# Patient Record
Sex: FEMALE | Race: WHITE | NOT HISPANIC OR LATINO | Employment: OTHER | URBAN - METROPOLITAN AREA
[De-identification: names, ages, dates, MRNs, and addresses within clinical notes are randomized per-mention and may not be internally consistent; named-entity substitution may affect disease eponyms.]

---

## 2023-01-21 PROBLEM — M77.41 METATARSALGIA OF RIGHT FOOT: Status: ACTIVE | Noted: 2023-01-21

## 2023-01-21 PROBLEM — I48.91 ATRIAL FIBRILLATION (MULTI): Status: ACTIVE | Noted: 2023-01-21

## 2023-01-21 PROBLEM — K21.9 GERD (GASTROESOPHAGEAL REFLUX DISEASE): Status: ACTIVE | Noted: 2023-01-21

## 2023-01-21 PROBLEM — I73.9 CLAUDICATION OF BOTH LOWER EXTREMITIES (CMS-HCC): Status: ACTIVE | Noted: 2023-01-21

## 2023-01-21 PROBLEM — E78.5 ELEVATED LIPIDS: Status: ACTIVE | Noted: 2023-01-21

## 2023-01-21 PROBLEM — R26.89 ANTALGIC GAIT: Status: ACTIVE | Noted: 2023-01-21

## 2023-01-21 PROBLEM — I10 HBP (HIGH BLOOD PRESSURE): Status: ACTIVE | Noted: 2023-01-21

## 2023-01-21 PROBLEM — H90.3 BILATERAL HIGH FREQUENCY SENSORINEURAL HEARING LOSS: Status: ACTIVE | Noted: 2023-01-21

## 2023-01-21 PROBLEM — F32.A DEPRESSION: Status: ACTIVE | Noted: 2023-01-21

## 2023-01-21 PROBLEM — C43.62 MALIGNANT MELANOMA OF LEFT FOREARM (MULTI): Status: ACTIVE | Noted: 2023-01-21

## 2023-01-21 PROBLEM — M17.11 ARTHRITIS OF RIGHT KNEE: Status: ACTIVE | Noted: 2023-01-21

## 2023-01-21 PROBLEM — M79.671 FOOT PAIN, RIGHT: Status: ACTIVE | Noted: 2023-01-21

## 2023-01-21 PROBLEM — M19.90 ARTHRITIS: Status: ACTIVE | Noted: 2023-01-21

## 2023-01-21 PROBLEM — M17.12 ARTHRITIS OF LEFT KNEE: Status: ACTIVE | Noted: 2023-01-21

## 2023-01-21 PROBLEM — M20.41 ACQUIRED HAMMER TOE OF RIGHT FOOT: Status: ACTIVE | Noted: 2023-01-21

## 2023-01-21 PROBLEM — E55.9 VITAMIN D DEFICIENCY, UNSPECIFIED: Status: ACTIVE | Noted: 2023-01-21

## 2023-01-21 PROBLEM — M51.26 HERNIATED LUMBAR INTERVERTEBRAL DISC: Status: ACTIVE | Noted: 2023-01-21

## 2023-01-21 PROBLEM — F32.1 DEPRESSION, MAJOR, SINGLE EPISODE, MODERATE (MULTI): Status: ACTIVE | Noted: 2023-01-21

## 2023-01-21 RX ORDER — HYDROCODONE BITARTRATE AND ACETAMINOPHEN 5; 325 MG/1; MG/1
1 TABLET ORAL EVERY 6 HOURS PRN
COMMUNITY
End: 2023-06-06 | Stop reason: ALTCHOICE

## 2023-01-21 RX ORDER — TORSEMIDE 20 MG/1
1 TABLET ORAL
COMMUNITY
Start: 2013-06-10

## 2023-01-21 RX ORDER — RAMIPRIL 5 MG/1
1 CAPSULE ORAL DAILY
COMMUNITY
Start: 2012-05-17 | End: 2023-11-29 | Stop reason: SDUPTHER

## 2023-01-21 RX ORDER — DIGOXIN 250 MCG
1 TABLET ORAL DAILY
COMMUNITY
Start: 2016-02-26 | End: 2023-08-29 | Stop reason: ALTCHOICE

## 2023-01-21 RX ORDER — FLUOXETINE HYDROCHLORIDE 40 MG/1
1 CAPSULE ORAL DAILY
COMMUNITY
Start: 2012-05-17 | End: 2023-03-07 | Stop reason: SDUPTHER

## 2023-01-21 RX ORDER — CYCLOBENZAPRINE HCL 10 MG
10 TABLET ORAL NIGHTLY
COMMUNITY
End: 2024-01-04 | Stop reason: SDUPTHER

## 2023-01-21 RX ORDER — WARFARIN SODIUM 5 MG/1
1 TABLET ORAL
COMMUNITY
Start: 2012-01-19 | End: 2023-03-07 | Stop reason: SDUPTHER

## 2023-01-21 RX ORDER — MOMETASONE FUROATE 1 MG/G
1 CREAM TOPICAL 2 TIMES DAILY
COMMUNITY
Start: 2017-05-30

## 2023-01-21 RX ORDER — POTASSIUM CHLORIDE 20 MEQ/1
1 TABLET, EXTENDED RELEASE ORAL DAILY
COMMUNITY
Start: 2013-06-10

## 2023-01-21 RX ORDER — WARFARIN 7.5 MG/1
7.5 TABLET ORAL 3 TIMES WEEKLY
COMMUNITY
Start: 2021-11-18 | End: 2023-03-07 | Stop reason: SDUPTHER

## 2023-01-21 RX ORDER — ATORVASTATIN CALCIUM 40 MG/1
1 TABLET, FILM COATED ORAL DAILY
COMMUNITY
Start: 2019-06-28 | End: 2023-03-07 | Stop reason: SDUPTHER

## 2023-01-21 RX ORDER — VERAPAMIL HYDROCHLORIDE 180 MG/1
1 TABLET, FILM COATED, EXTENDED RELEASE ORAL DAILY
COMMUNITY
Start: 2012-08-23 | End: 2023-08-29 | Stop reason: SDUPTHER

## 2023-01-21 RX ORDER — ESOMEPRAZOLE MAGNESIUM 40 MG/1
40 CAPSULE, DELAYED RELEASE ORAL DAILY
COMMUNITY
End: 2023-08-29 | Stop reason: SDUPTHER

## 2023-01-21 RX ORDER — ACYCLOVIR 50 MG/G
1 OINTMENT TOPICAL 3 TIMES DAILY
COMMUNITY
End: 2023-06-06 | Stop reason: SDUPTHER

## 2023-02-26 PROBLEM — M19.012 GLENOHUMERAL ARTHRITIS, LEFT: Status: ACTIVE | Noted: 2023-02-26

## 2023-02-26 PROBLEM — M25.512 PAIN IN JOINT OF LEFT SHOULDER: Status: ACTIVE | Noted: 2023-02-26

## 2023-03-07 ENCOUNTER — OFFICE VISIT (OUTPATIENT)
Dept: PRIMARY CARE | Facility: CLINIC | Age: 78
End: 2023-03-07
Payer: MEDICARE

## 2023-03-07 VITALS
TEMPERATURE: 97.2 F | WEIGHT: 198 LBS | BODY MASS INDEX: 31.96 KG/M2 | SYSTOLIC BLOOD PRESSURE: 108 MMHG | HEART RATE: 86 BPM | DIASTOLIC BLOOD PRESSURE: 70 MMHG | OXYGEN SATURATION: 96 %

## 2023-03-07 DIAGNOSIS — I48.0 PAROXYSMAL ATRIAL FIBRILLATION (MULTI): Primary | ICD-10-CM

## 2023-03-07 DIAGNOSIS — Z12.39 SCREENING BREAST EXAMINATION: ICD-10-CM

## 2023-03-07 DIAGNOSIS — E78.5 ELEVATED LIPIDS: ICD-10-CM

## 2023-03-07 DIAGNOSIS — F32.1 DEPRESSION, MAJOR, SINGLE EPISODE, MODERATE (MULTI): ICD-10-CM

## 2023-03-07 DIAGNOSIS — Z12.31 ENCOUNTER FOR SCREENING MAMMOGRAM FOR MALIGNANT NEOPLASM OF BREAST: ICD-10-CM

## 2023-03-07 PROCEDURE — 1159F MED LIST DOCD IN RCRD: CPT | Performed by: FAMILY MEDICINE

## 2023-03-07 PROCEDURE — 99214 OFFICE O/P EST MOD 30 MIN: CPT | Performed by: FAMILY MEDICINE

## 2023-03-07 PROCEDURE — 3074F SYST BP LT 130 MM HG: CPT | Performed by: FAMILY MEDICINE

## 2023-03-07 PROCEDURE — 3078F DIAST BP <80 MM HG: CPT | Performed by: FAMILY MEDICINE

## 2023-03-07 PROCEDURE — 1036F TOBACCO NON-USER: CPT | Performed by: FAMILY MEDICINE

## 2023-03-07 RX ORDER — ATORVASTATIN CALCIUM 40 MG/1
40 TABLET, FILM COATED ORAL DAILY
Qty: 90 TABLET | Refills: 1 | Status: SHIPPED | OUTPATIENT
Start: 2023-03-07 | End: 2023-07-06 | Stop reason: SDUPTHER

## 2023-03-07 RX ORDER — WARFARIN 7.5 MG/1
7.5 TABLET ORAL 3 TIMES WEEKLY
Qty: 90 TABLET | Refills: 1 | Status: SHIPPED | OUTPATIENT
Start: 2023-03-08

## 2023-03-07 RX ORDER — FLUOXETINE HYDROCHLORIDE 40 MG/1
40 CAPSULE ORAL 2 TIMES DAILY
Qty: 180 CAPSULE | Refills: 0 | Status: SHIPPED | OUTPATIENT
Start: 2023-03-07 | End: 2023-06-06 | Stop reason: SDUPTHER

## 2023-03-07 RX ORDER — WARFARIN SODIUM 5 MG/1
5 TABLET ORAL
Qty: 90 TABLET | Refills: 1 | Status: SHIPPED | OUTPATIENT
Start: 2023-03-07 | End: 2023-08-29 | Stop reason: SDUPTHER

## 2023-03-07 ASSESSMENT — PATIENT HEALTH QUESTIONNAIRE - PHQ9
2. FEELING DOWN, DEPRESSED OR HOPELESS: NOT AT ALL
1. LITTLE INTEREST OR PLEASURE IN DOING THINGS: NOT AT ALL
SUM OF ALL RESPONSES TO PHQ9 QUESTIONS 1 AND 2: 0

## 2023-03-07 ASSESSMENT — ENCOUNTER SYMPTOMS
OCCASIONAL FEELINGS OF UNSTEADINESS: 0
LOSS OF SENSATION IN FEET: 0
DEPRESSION: 0

## 2023-03-07 NOTE — PROGRESS NOTES
Subjective   Patient ID: Regina Tam is a 77 y.o. female who presents for Follow-up.    HPI Pt is doing ok  Goes to Coumadin clinic  Pt denies cp, sob,edema, dizziness  Pt mood is  not good, they lost their home in [place]  Needs lipids checked      Review of Systems   All other systems reviewed and are negative.      Objective   /70   Pulse 86   Temp 36.2 °C (97.2 °F)   Wt 89.8 kg (198 lb)   SpO2 96%   BMI 31.96 kg/m²     Physical Exam  Constitutional:       Appearance: Normal appearance.   HENT:      Head: Normocephalic and atraumatic.      Right Ear: Tympanic membrane, ear canal and external ear normal.      Left Ear: Tympanic membrane, ear canal and external ear normal.      Nose: Nose normal.      Mouth/Throat:      Mouth: Mucous membranes are moist.      Pharynx: Oropharynx is clear.   Eyes:      Extraocular Movements: Extraocular movements intact.      Conjunctiva/sclera: Conjunctivae normal.      Pupils: Pupils are equal, round, and reactive to light.   Cardiovascular:      Rate and Rhythm: Normal rate and regular rhythm.      Pulses: Normal pulses.      Heart sounds: Normal heart sounds.   Pulmonary:      Effort: Pulmonary effort is normal.      Breath sounds: Normal breath sounds.   Abdominal:      General: Abdomen is flat. Bowel sounds are normal.      Palpations: Abdomen is soft.   Genitourinary:     Rectum: Normal.   Musculoskeletal:         General: Normal range of motion.      Cervical back: Normal range of motion and neck supple.   Skin:     General: Skin is warm and dry.      Capillary Refill: Capillary refill takes less than 2 seconds.   Neurological:      General: No focal deficit present.      Mental Status: She is alert and oriented to person, place, and time.   Psychiatric:         Mood and Affect: Mood normal.         Behavior: Behavior normal.         Thought Content: Thought content normal.         Assessment/Plan   Diagnoses and all orders for this visit:  Paroxysmal atrial  fibrillation (CMS/HCC)  -     warfarin (Coumadin) 7.5 mg tablet; Take 1 tablet (7.5 mg) by mouth 3 times a week.  -     warfarin (Coumadin) 5 mg tablet; Take 1 tablet (5 mg) by mouth 4 times a week.  Elevated lipids  -     atorvastatin (Lipitor) 40 mg tablet; Take 1 tablet (40 mg) by mouth once daily.  -     Comprehensive Metabolic Panel; Future  -     Lipid Panel; Future  Screening breast examination  -     BI mammo bilateral screening tomosynthesis; Future  Encounter for screening mammogram for malignant neoplasm of breast  -     BI mammo bilateral screening tomosynthesis; Future  Depression, major, single episode, moderate (CMS/HCC)  -     FLUoxetine (PROzac) 40 mg capsule; Take 1 capsule (40 mg) by mouth in the morning and 1 capsule (40 mg) before bedtime.

## 2023-04-24 ENCOUNTER — TELEPHONE (OUTPATIENT)
Dept: PRIMARY CARE | Facility: CLINIC | Age: 78
End: 2023-04-24
Payer: MEDICARE

## 2023-04-24 ENCOUNTER — DOCUMENTATION (OUTPATIENT)
Dept: PRIMARY CARE | Facility: CLINIC | Age: 78
End: 2023-04-24
Payer: MEDICARE

## 2023-04-24 DIAGNOSIS — M51.26 HERNIATED LUMBAR INTERVERTEBRAL DISC: Primary | ICD-10-CM

## 2023-05-18 DIAGNOSIS — F32.1 DEPRESSION, MAJOR, SINGLE EPISODE, MODERATE (MULTI): ICD-10-CM

## 2023-05-22 RX ORDER — FLUOXETINE HYDROCHLORIDE 40 MG/1
CAPSULE ORAL
Qty: 180 CAPSULE | Refills: 3 | OUTPATIENT
Start: 2023-05-22

## 2023-05-23 ENCOUNTER — LAB (OUTPATIENT)
Dept: LAB | Facility: LAB | Age: 78
End: 2023-05-23
Payer: MEDICARE

## 2023-05-23 DIAGNOSIS — E78.5 ELEVATED LIPIDS: ICD-10-CM

## 2023-05-23 LAB
ALANINE AMINOTRANSFERASE (SGPT) (U/L) IN SER/PLAS: 38 U/L (ref 7–45)
ALBUMIN (G/DL) IN SER/PLAS: 4.1 G/DL (ref 3.4–5)
ALKALINE PHOSPHATASE (U/L) IN SER/PLAS: 258 U/L (ref 33–136)
ANION GAP IN SER/PLAS: 11 MMOL/L (ref 10–20)
ASPARTATE AMINOTRANSFERASE (SGOT) (U/L) IN SER/PLAS: 38 U/L (ref 9–39)
BILIRUBIN TOTAL (MG/DL) IN SER/PLAS: 0.5 MG/DL (ref 0–1.2)
CALCIUM (MG/DL) IN SER/PLAS: 9.6 MG/DL (ref 8.6–10.6)
CARBON DIOXIDE, TOTAL (MMOL/L) IN SER/PLAS: 32 MMOL/L (ref 21–32)
CHLORIDE (MMOL/L) IN SER/PLAS: 102 MMOL/L (ref 98–107)
CHOLESTEROL (MG/DL) IN SER/PLAS: 136 MG/DL (ref 0–199)
CHOLESTEROL IN HDL (MG/DL) IN SER/PLAS: 42.5 MG/DL
CHOLESTEROL/HDL RATIO: 3.2
CREATININE (MG/DL) IN SER/PLAS: 0.76 MG/DL (ref 0.5–1.05)
GFR FEMALE: 81 ML/MIN/1.73M2
GLUCOSE (MG/DL) IN SER/PLAS: 85 MG/DL (ref 74–99)
LDL: 61 MG/DL (ref 0–99)
POTASSIUM (MMOL/L) IN SER/PLAS: 4.9 MMOL/L (ref 3.5–5.3)
PROTEIN TOTAL: 7.1 G/DL (ref 6.4–8.2)
SODIUM (MMOL/L) IN SER/PLAS: 140 MMOL/L (ref 136–145)
TRIGLYCERIDE (MG/DL) IN SER/PLAS: 161 MG/DL (ref 0–149)
UREA NITROGEN (MG/DL) IN SER/PLAS: 19 MG/DL (ref 6–23)
VLDL: 32 MG/DL (ref 0–40)

## 2023-05-23 PROCEDURE — 80053 COMPREHEN METABOLIC PANEL: CPT

## 2023-05-23 PROCEDURE — 36415 COLL VENOUS BLD VENIPUNCTURE: CPT

## 2023-05-23 PROCEDURE — 80061 LIPID PANEL: CPT

## 2023-06-06 ENCOUNTER — OFFICE VISIT (OUTPATIENT)
Dept: PRIMARY CARE | Facility: CLINIC | Age: 78
End: 2023-06-06
Payer: MEDICARE

## 2023-06-06 VITALS
SYSTOLIC BLOOD PRESSURE: 120 MMHG | DIASTOLIC BLOOD PRESSURE: 77 MMHG | WEIGHT: 197 LBS | BODY MASS INDEX: 31.8 KG/M2 | OXYGEN SATURATION: 98 % | HEART RATE: 79 BPM

## 2023-06-06 DIAGNOSIS — R60.0 EDEMA, LOWER EXTREMITY: ICD-10-CM

## 2023-06-06 DIAGNOSIS — L20.9 ATOPIC DERMATITIS, UNSPECIFIED TYPE: Primary | ICD-10-CM

## 2023-06-06 DIAGNOSIS — I73.9 CLAUDICATION OF BOTH LOWER EXTREMITIES (CMS-HCC): ICD-10-CM

## 2023-06-06 DIAGNOSIS — M17.12 ARTHRITIS OF LEFT KNEE: ICD-10-CM

## 2023-06-06 DIAGNOSIS — F32.1 DEPRESSION, MAJOR, SINGLE EPISODE, MODERATE (MULTI): ICD-10-CM

## 2023-06-06 PROCEDURE — 1036F TOBACCO NON-USER: CPT | Performed by: FAMILY MEDICINE

## 2023-06-06 PROCEDURE — 1160F RVW MEDS BY RX/DR IN RCRD: CPT | Performed by: FAMILY MEDICINE

## 2023-06-06 PROCEDURE — 99214 OFFICE O/P EST MOD 30 MIN: CPT | Performed by: FAMILY MEDICINE

## 2023-06-06 PROCEDURE — 3078F DIAST BP <80 MM HG: CPT | Performed by: FAMILY MEDICINE

## 2023-06-06 PROCEDURE — 3074F SYST BP LT 130 MM HG: CPT | Performed by: FAMILY MEDICINE

## 2023-06-06 PROCEDURE — 1159F MED LIST DOCD IN RCRD: CPT | Performed by: FAMILY MEDICINE

## 2023-06-06 RX ORDER — DESONIDE 0.5 MG/G
CREAM TOPICAL
COMMUNITY

## 2023-06-06 RX ORDER — ACYCLOVIR 50 MG/G
1 OINTMENT TOPICAL 3 TIMES DAILY
Qty: 60 G | Refills: 1 | Status: SHIPPED | OUTPATIENT
Start: 2023-06-06

## 2023-06-06 RX ORDER — FLUOXETINE HYDROCHLORIDE 40 MG/1
40 CAPSULE ORAL 2 TIMES DAILY
Qty: 180 CAPSULE | Refills: 0 | Status: SHIPPED | OUTPATIENT
Start: 2023-06-06 | End: 2023-08-29 | Stop reason: SDUPTHER

## 2023-06-06 RX ORDER — VIT C/E/ZN/COPPR/LUTEIN/ZEAXAN 250MG-90MG
1000 CAPSULE ORAL
COMMUNITY

## 2023-06-06 RX ORDER — FLUOCINONIDE TOPICAL SOLUTION USP, 0.05% 0.5 MG/ML
SOLUTION TOPICAL 2 TIMES DAILY PRN
Qty: 60 ML | Refills: 3 | Status: SHIPPED | OUTPATIENT
Start: 2023-06-06 | End: 2024-06-05

## 2023-06-06 RX ORDER — TRAMADOL HYDROCHLORIDE 50 MG/1
TABLET ORAL
COMMUNITY
End: 2023-06-06 | Stop reason: ALTCHOICE

## 2023-06-06 ASSESSMENT — PATIENT HEALTH QUESTIONNAIRE - PHQ9
2. FEELING DOWN, DEPRESSED OR HOPELESS: MORE THAN HALF THE DAYS
10. IF YOU CHECKED OFF ANY PROBLEMS, HOW DIFFICULT HAVE THESE PROBLEMS MADE IT FOR YOU TO DO YOUR WORK, TAKE CARE OF THINGS AT HOME, OR GET ALONG WITH OTHER PEOPLE: SOMEWHAT DIFFICULT
1. LITTLE INTEREST OR PLEASURE IN DOING THINGS: NOT AT ALL
SUM OF ALL RESPONSES TO PHQ9 QUESTIONS 1 AND 2: 2

## 2023-06-06 ASSESSMENT — ENCOUNTER SYMPTOMS
LOSS OF SENSATION IN FEET: 0
OCCASIONAL FEELINGS OF UNSTEADINESS: 0
DEPRESSION: 0

## 2023-06-06 NOTE — PROGRESS NOTES
Subjective   Patient ID: Regina Tam is a 77 y.o. female who presents for Follow-up (FOLLOW UP).    HPI   Pt is doing ok  Pt needsrefill on creams for eczema and hsv  Mood stable  PT/inr good now in coumadin clinic  Pt denies cp, sob  Does have edema, pain and discoloration LLE    Review of Systems   Cardiovascular:  Positive for leg swelling.   All other systems reviewed and are negative.      Objective   /77   Pulse 79   Wt 89.4 kg (197 lb)   SpO2 98%   BMI 31.80 kg/m²     Physical Exam  Constitutional:       Appearance: Normal appearance.   HENT:      Head: Normocephalic and atraumatic.      Right Ear: Tympanic membrane, ear canal and external ear normal.      Left Ear: Tympanic membrane, ear canal and external ear normal.      Nose: Nose normal.      Mouth/Throat:      Mouth: Mucous membranes are moist.      Pharynx: Oropharynx is clear.   Eyes:      Extraocular Movements: Extraocular movements intact.      Conjunctiva/sclera: Conjunctivae normal.      Pupils: Pupils are equal, round, and reactive to light.   Cardiovascular:      Rate and Rhythm: Normal rate and regular rhythm.      Pulses: Normal pulses.      Heart sounds: Normal heart sounds.   Pulmonary:      Effort: Pulmonary effort is normal.      Breath sounds: Normal breath sounds.   Abdominal:      General: Abdomen is flat. Bowel sounds are normal.      Palpations: Abdomen is soft.   Musculoskeletal:         General: Normal range of motion.      Cervical back: Normal range of motion and neck supple.      Left lower leg: Edema present.   Skin:     General: Skin is warm and dry.      Capillary Refill: Capillary refill takes less than 2 seconds.   Neurological:      General: No focal deficit present.      Mental Status: She is alert and oriented to person, place, and time.   Psychiatric:         Mood and Affect: Mood normal.         Behavior: Behavior normal.         Thought Content: Thought content normal.         Assessment/Plan    Diagnoses and all orders for this visit:  Atopic dermatitis, unspecified type  -     acyclovir (Zovirax) 5 % ointment; Apply 1 Application topically 3 times a day.  -     fluocinonide (Lidex) 0.05 % external solution; Apply topically 2 times a day as needed for irritation or rash.  Depression, major, single episode, moderate (CMS/HCC)  -     FLUoxetine (PROzac) 40 mg capsule; Take 1 capsule (40 mg) by mouth 2 times a day.  Claudication of both lower extremities (CMS/HCC)  -     Disability Placard  Arthritis of left knee  -     Disability Placard  Edema, lower extremity  -     Lower extremity venous duplex left; Future

## 2023-07-06 DIAGNOSIS — E78.5 ELEVATED LIPIDS: ICD-10-CM

## 2023-07-06 RX ORDER — ATORVASTATIN CALCIUM 40 MG/1
40 TABLET, FILM COATED ORAL DAILY
Qty: 90 TABLET | Refills: 0 | Status: SHIPPED | OUTPATIENT
Start: 2023-07-06 | End: 2023-08-29 | Stop reason: SDUPTHER

## 2023-07-06 NOTE — TELEPHONE ENCOUNTER
PT CALLED REQUESTING A REFILL OF ATORVASTATIN TO [facility]. LAST APPT WAS 6.6.23 AND NEXT IS 8.29.23

## 2023-08-29 ENCOUNTER — OFFICE VISIT (OUTPATIENT)
Dept: PRIMARY CARE | Facility: CLINIC | Age: 78
End: 2023-08-29
Payer: MEDICARE

## 2023-08-29 VITALS
WEIGHT: 198 LBS | BODY MASS INDEX: 31.96 KG/M2 | DIASTOLIC BLOOD PRESSURE: 78 MMHG | HEART RATE: 89 BPM | SYSTOLIC BLOOD PRESSURE: 122 MMHG | OXYGEN SATURATION: 98 %

## 2023-08-29 DIAGNOSIS — I48.91 ATRIAL FIBRILLATION, UNSPECIFIED TYPE (MULTI): Primary | ICD-10-CM

## 2023-08-29 DIAGNOSIS — E78.5 ELEVATED LIPIDS: ICD-10-CM

## 2023-08-29 DIAGNOSIS — E78.00 PURE HYPERCHOLESTEROLEMIA: ICD-10-CM

## 2023-08-29 DIAGNOSIS — C43.62 MALIGNANT MELANOMA OF LEFT FOREARM (MULTI): ICD-10-CM

## 2023-08-29 DIAGNOSIS — I73.9 CLAUDICATION OF BOTH LOWER EXTREMITIES (CMS-HCC): ICD-10-CM

## 2023-08-29 DIAGNOSIS — I48.0 PAROXYSMAL ATRIAL FIBRILLATION (MULTI): ICD-10-CM

## 2023-08-29 DIAGNOSIS — F32.1 DEPRESSION, MAJOR, SINGLE EPISODE, MODERATE (MULTI): ICD-10-CM

## 2023-08-29 DIAGNOSIS — K21.9 GASTROESOPHAGEAL REFLUX DISEASE WITHOUT ESOPHAGITIS: ICD-10-CM

## 2023-08-29 DIAGNOSIS — I10 PRIMARY HYPERTENSION: ICD-10-CM

## 2023-08-29 PROBLEM — T84.9XXA COMPLICATION OF INTERNAL RIGHT KNEE PROSTHESIS (CMS-HCC): Status: ACTIVE | Noted: 2023-08-04

## 2023-08-29 PROBLEM — H35.3231 EXUDATIVE AGE-RELATED MACULAR DEGENERATION, BILATERAL, WITH ACTIVE CHOROIDAL NEOVASCULARIZATION (MULTI): Status: ACTIVE | Noted: 2017-07-14

## 2023-08-29 PROBLEM — Z96.651 HISTORY OF TOTAL KNEE REPLACEMENT, RIGHT: Status: RESOLVED | Noted: 2023-07-20 | Resolved: 2023-08-29

## 2023-08-29 PROBLEM — M75.80 ROTATOR CUFF TENDONITIS: Status: RESOLVED | Noted: 2023-08-29 | Resolved: 2023-08-29

## 2023-08-29 PROBLEM — R91.8 LUNG NODULES: Status: ACTIVE | Noted: 2023-08-29

## 2023-08-29 PROBLEM — Z96.651 COMPLICATION OF INTERNAL RIGHT KNEE PROSTHESIS (CMS-HCC): Status: ACTIVE | Noted: 2023-08-04

## 2023-08-29 PROBLEM — I11.9 HYPERTENSIVE CARDIOVASCULAR DISEASE: Status: ACTIVE | Noted: 2023-08-29

## 2023-08-29 PROBLEM — S80.12XA LEG HEMATOMA, LEFT, INITIAL ENCOUNTER: Status: RESOLVED | Noted: 2023-08-29 | Resolved: 2023-08-29

## 2023-08-29 PROBLEM — E04.1 THYROID NODULE: Status: ACTIVE | Noted: 2023-08-29

## 2023-08-29 PROCEDURE — 1036F TOBACCO NON-USER: CPT | Performed by: FAMILY MEDICINE

## 2023-08-29 PROCEDURE — 1160F RVW MEDS BY RX/DR IN RCRD: CPT | Performed by: FAMILY MEDICINE

## 2023-08-29 PROCEDURE — 3074F SYST BP LT 130 MM HG: CPT | Performed by: FAMILY MEDICINE

## 2023-08-29 PROCEDURE — 1159F MED LIST DOCD IN RCRD: CPT | Performed by: FAMILY MEDICINE

## 2023-08-29 PROCEDURE — 99214 OFFICE O/P EST MOD 30 MIN: CPT | Performed by: FAMILY MEDICINE

## 2023-08-29 PROCEDURE — 3078F DIAST BP <80 MM HG: CPT | Performed by: FAMILY MEDICINE

## 2023-08-29 RX ORDER — CHLORHEXIDINE GLUCONATE ORAL RINSE 1.2 MG/ML
SOLUTION DENTAL
COMMUNITY
Start: 2023-08-14

## 2023-08-29 RX ORDER — VERAPAMIL HYDROCHLORIDE 180 MG/1
180 TABLET, FILM COATED, EXTENDED RELEASE ORAL NIGHTLY
Qty: 90 TABLET | Refills: 1 | Status: SHIPPED | OUTPATIENT
Start: 2023-08-29 | End: 2024-02-23

## 2023-08-29 RX ORDER — WARFARIN SODIUM 5 MG/1
5 TABLET ORAL
Qty: 90 TABLET | Refills: 1 | Status: SHIPPED | OUTPATIENT
Start: 2023-08-29 | End: 2024-05-22

## 2023-08-29 RX ORDER — ESOMEPRAZOLE MAGNESIUM 40 MG/1
40 CAPSULE, DELAYED RELEASE ORAL
Qty: 90 CAPSULE | Refills: 3 | Status: SHIPPED | OUTPATIENT
Start: 2023-08-29 | End: 2023-09-05 | Stop reason: SDUPTHER

## 2023-08-29 RX ORDER — DIGOXIN 125 MCG
125 TABLET ORAL DAILY
Qty: 90 TABLET | Refills: 3 | Status: SHIPPED | OUTPATIENT
Start: 2023-08-29 | End: 2024-08-28

## 2023-08-29 RX ORDER — ATORVASTATIN CALCIUM 40 MG/1
40 TABLET, FILM COATED ORAL DAILY
Qty: 90 TABLET | Refills: 1 | Status: SHIPPED | OUTPATIENT
Start: 2023-08-29 | End: 2024-03-05 | Stop reason: SDUPTHER

## 2023-08-29 RX ORDER — FLUOXETINE HYDROCHLORIDE 40 MG/1
40 CAPSULE ORAL 2 TIMES DAILY
Qty: 180 CAPSULE | Refills: 1 | Status: SHIPPED | OUTPATIENT
Start: 2023-08-29 | End: 2023-11-29 | Stop reason: SDUPTHER

## 2023-08-29 ASSESSMENT — ENCOUNTER SYMPTOMS
ARTHRALGIAS: 1
DEPRESSION: 0
OCCASIONAL FEELINGS OF UNSTEADINESS: 0
LOSS OF SENSATION IN FEET: 0
MYALGIAS: 1
BACK PAIN: 1

## 2023-08-29 NOTE — PROGRESS NOTES
Subjective   Patient ID: Regina Tam is a 77 y.o. female who presents for Follow-up (FOLLOW UP).    HPI   Pt is doing well  Will be having nee surgery and colonoscopy  Pt denies cp,sob  Goes to coumadin clinic  Mood stable  Review of Systems   Musculoskeletal:  Positive for arthralgias, back pain and myalgias.   All other systems reviewed and are negative.      Objective   /78   Pulse 89   Wt 89.8 kg (198 lb)   SpO2 98%   BMI 31.96 kg/m²     Physical Exam  Constitutional:       Appearance: Normal appearance.   HENT:      Head: Normocephalic and atraumatic.      Right Ear: Tympanic membrane, ear canal and external ear normal.      Left Ear: Tympanic membrane, ear canal and external ear normal.      Nose: Nose normal.      Mouth/Throat:      Mouth: Mucous membranes are moist.      Pharynx: Oropharynx is clear.   Eyes:      Extraocular Movements: Extraocular movements intact.      Conjunctiva/sclera: Conjunctivae normal.      Pupils: Pupils are equal, round, and reactive to light.   Cardiovascular:      Rate and Rhythm: Normal rate and regular rhythm.      Pulses: Normal pulses.      Heart sounds: Normal heart sounds.   Pulmonary:      Effort: Pulmonary effort is normal.      Breath sounds: Normal breath sounds.   Abdominal:      General: Abdomen is flat. Bowel sounds are normal.      Palpations: Abdomen is soft.   Musculoskeletal:         General: Normal range of motion.      Cervical back: Normal range of motion and neck supple.   Skin:     General: Skin is warm and dry.      Capillary Refill: Capillary refill takes less than 2 seconds.   Neurological:      General: No focal deficit present.      Mental Status: She is alert and oriented to person, place, and time.   Psychiatric:         Mood and Affect: Mood normal.         Behavior: Behavior normal.         Thought Content: Thought content normal.         Assessment/Plan   Diagnoses and all orders for this visit:  Atrial fibrillation, unspecified  type (CMS/HCC)  -     digoxin (Lanoxin) 125 MCG tablet; Take 1 tablet (125 mcg) by mouth once daily.  -     verapamil SR (Calan-SR) 180 mg ER tablet; Take 1 tablet (180 mg) by mouth once daily at bedtime. Take as directed.  -     TSH with reflex to Free T4 if abnormal; Future  -     Lipid Panel; Future  -     Comprehensive Metabolic Panel; Future  -     CBC and Auto Differential; Future  Malignant melanoma of left forearm (CMS/HCC)  Claudication of both lower extremities (CMS/HCC)  Primary hypertension  Paroxysmal atrial fibrillation (CMS/HCC)  -     warfarin (Coumadin) 5 mg tablet; Take 1 tablet (5 mg) by mouth 4 times a week.  Depression, major, single episode, moderate (CMS/HCC)  -     FLUoxetine (PROzac) 40 mg capsule; Take 1 capsule (40 mg) by mouth 2 times a day.  Elevated lipids  -     atorvastatin (Lipitor) 40 mg tablet; Take 1 tablet (40 mg) by mouth once daily.  Gastroesophageal reflux disease without esophagitis  -     esomeprazole (NexIUM) 40 mg DR capsule; Take 1 capsule (40 mg) by mouth once daily in the morning. Take before meals. Do not open capsule.  Pure hypercholesterolemia  -     Lipid Panel; Future

## 2023-09-01 ENCOUNTER — TELEPHONE (OUTPATIENT)
Dept: PRIMARY CARE | Facility: CLINIC | Age: 78
End: 2023-09-01
Payer: MEDICARE

## 2023-09-01 NOTE — TELEPHONE ENCOUNTER
PT CALLED STATING HER ESOMEPRAZOLE WAS EXPENSIVE, SHE GOT THE SCRIPT FOR THIS MONTH BUT FOR FUTURE SHE WOULD LIKE IT PRINTED SO SHE CAN USE [facility]. TUESDAY  CAN YOU PRINT THE RX OUT AND I CAN MAIL IT TO THE PATIENT? PLEASE ADVISE.

## 2023-09-05 DIAGNOSIS — K21.9 GASTROESOPHAGEAL REFLUX DISEASE WITHOUT ESOPHAGITIS: ICD-10-CM

## 2023-09-05 RX ORDER — ESOMEPRAZOLE MAGNESIUM 40 MG/1
40 CAPSULE, DELAYED RELEASE ORAL
Qty: 90 CAPSULE | Refills: 3 | Status: SHIPPED | OUTPATIENT
Start: 2023-09-05 | End: 2023-11-29 | Stop reason: SDUPTHER

## 2023-09-11 DIAGNOSIS — I48.91 ATRIAL FIBRILLATION, UNSPECIFIED TYPE (MULTI): Primary | ICD-10-CM

## 2023-09-11 LAB
INR IN PPP BY COAGULATION ASSAY EXTERNAL: 3.6
PROTHROMBIN TIME (PT) IN PPP BY COAGULATION ASSAY EXTERNAL: NORMAL SECONDS

## 2023-09-12 ENCOUNTER — TELEMEDICINE (OUTPATIENT)
Dept: PRIMARY CARE | Facility: CLINIC | Age: 78
End: 2023-09-12
Payer: MEDICARE

## 2023-09-12 DIAGNOSIS — J01.01 ACUTE RECURRENT MAXILLARY SINUSITIS: Primary | ICD-10-CM

## 2023-09-12 PROCEDURE — 99213 OFFICE O/P EST LOW 20 MIN: CPT | Performed by: FAMILY MEDICINE

## 2023-09-12 RX ORDER — CLARITHROMYCIN 500 MG/1
500 TABLET, FILM COATED ORAL 2 TIMES DAILY
Qty: 28 TABLET | Refills: 2 | Status: SHIPPED | OUTPATIENT
Start: 2023-09-12 | End: 2023-10-24

## 2023-09-12 ASSESSMENT — ENCOUNTER SYMPTOMS: SINUS PRESSURE: 1

## 2023-09-12 NOTE — PROGRESS NOTES
Subjective   Patient ID: Regina Tam is a 77 y.o. female who presents for VIRTUAL (SICK SINUS ISSUES).  Chief Complaint_UH:  An interactive audio and video telecommunication system which permits real time communications between the patient (at the originating site) and provider (at the distant site) was utilized to provide this telehealth service.   Virtual or Telephone Consent    An interactive audio and video telecommunication system which permits real time communications between the patient (at the originating site) and provider (at the distant site) was utilized to provide this telehealth service.   Verbal consent was requested and obtained from Regina Tam on this date, 09/12/23 for a telehealth visit.    HPI   Pt has sinus pressure and pain  Onset last week  Took 2 covid tests  Negative  Used otc antihistamines  Pt denies cp, sob edema, fevers, chills  Review of Systems   HENT:  Positive for sinus pressure.    All other systems reviewed and are negative.      Objective   There were no vitals taken for this visit.    Physical Exam  Constitutional:       Appearance: Normal appearance.   HENT:      Head: Normocephalic.      Nose: Nose normal.   Pulmonary:      Effort: Pulmonary effort is normal.   Neurological:      Mental Status: She is alert.   Psychiatric:         Mood and Affect: Mood normal.         Behavior: Behavior normal.         Assessment/Plan   Diagnoses and all orders for this visit:  Acute recurrent maxillary sinusitis  -     clarithromycin (Biaxin) 500 mg tablet; Take 1 tablet (500 mg) by mouth 2 times a day.

## 2023-09-18 DIAGNOSIS — I48.91 ATRIAL FIBRILLATION, UNSPECIFIED TYPE (MULTI): Primary | ICD-10-CM

## 2023-10-05 ENCOUNTER — APPOINTMENT (OUTPATIENT)
Dept: CARDIOLOGY | Facility: CLINIC | Age: 78
End: 2023-10-05
Payer: MEDICARE

## 2023-10-05 ENCOUNTER — EVALUATION (OUTPATIENT)
Dept: PHYSICAL THERAPY | Facility: CLINIC | Age: 78
End: 2023-10-05
Payer: MEDICARE

## 2023-10-05 DIAGNOSIS — M17.11 ARTHRITIS OF RIGHT KNEE: Primary | ICD-10-CM

## 2023-10-05 DIAGNOSIS — R26.89 DECREASED FUNCTIONAL MOBILITY: ICD-10-CM

## 2023-10-05 PROCEDURE — 97161 PT EVAL LOW COMPLEX 20 MIN: CPT | Mod: GP | Performed by: PHYSICAL THERAPIST

## 2023-10-05 PROCEDURE — 97110 THERAPEUTIC EXERCISES: CPT | Mod: GP | Performed by: PHYSICAL THERAPIST

## 2023-10-05 ASSESSMENT — PAIN SCALES - GENERAL: PAINLEVEL_OUTOF10: 7

## 2023-10-05 ASSESSMENT — PAIN DESCRIPTION - DESCRIPTORS: DESCRIPTORS: ACHING

## 2023-10-05 ASSESSMENT — PAIN - FUNCTIONAL ASSESSMENT: PAIN_FUNCTIONAL_ASSESSMENT: 0-10

## 2023-10-05 NOTE — LETTER
October 6, 2023    Nicko Nice MD  [address] [facility] [address]    Patient: Regina Tam   YOB: 1945   Date of Visit: 10/5/2023       Dear No referring provider defined for this encounter.    The attached plan of care is being sent to you because your patient’s medical reimbursement requires that you certify the plan of care. Your signature is required to allow uninterrupted insurance coverage.      You may indicate your approval by signing below and faxing this form back to us at Dept Fax: [phone].    Please call Dept: [phone] with any questions or concerns.    Thank you for this referral,        Adriana Solitario PT  [facility] [address] [facility]  [address] [facility] [id number] [phone]    Payer: Payor: MEDICARE / Plan: MEDICARE PART A AND B / Product Type: *No Product type* /                                                                         Date:     Dear Adriana Solitario PT,     Re: Ms. Regina Tam, MRN:[id number]    I certify that I have reviewed the attached plan of care and it is medically necessary for Ms. Regina Tam (1945) who is under my care.          ______________________________________                    _________________  Provider name and credentials                                           Date and time                                                                                           Plan of Care 10/5/23   Effective from: 10/5/2023  Effective to: 1/2/2024    Plan ID: [id number]                Participants as of Finalize on 10/5/2023      Name Type Comments Contact Info    Adriana Solitario PT Physical Therapist  [phone]    Nicko Nice MD Consulting Physician  [phone]           Last Plan Note       Author: Adriana Solitario PT Status: Signed Last edited: 10/5/2023  1:30 PM         Physical Therapy    Physical Therapy Evaluation    Patient Name: Regina Tam  MRN: [id number]  Today's  "Date: 10/5/2023     (per information provided by  pre-cert team)    Visit #: 1  Insurance Reviewed (per information provided by  pre-cert team)  Authorization required:  N  Certification Period:  10-5-23/1-2-24  Preferred Name:  Regina  Script:   eval and treat 1-2/week for 4-6 weeks/cert for 12 weeks    Assessment  Patient with recent TKR, needs work on/Skilled PT for ROM, flexibility, strength, balance, closed chain, gait and stair activities for improved overall function.   Problems:    Pain:  __x_  Posture/Body mechanics deficits:  _x__  Decreased knowledge HEP:  _x__  Decreased knowledge of Precautions:  ___  Activity Limitations:   __x_  ADL's/IADL's/Self-care skills:  _x__  ROM/Joint Mobility:  __x_  Strength:  __x_  Decreased functional level:   __x_  Flexibility:  __x_  Tenderness/decreased soft tissue mobility:  __x_  Gait/Stairs:  _x__  Fall Risk:  ___  Balance:  __x_  Edema:  ___  Participation restrictions:  ___  Sensory:  ___  Transfers:  ___  Decreased knowledge of brace:   ___  Other:  ___    X Indicates included in problem list     Goals:      STG:  In two weeks.   Increased postural awareness.     Compliant with HEP    LTG: by discharge    Increased postural awareness and posture WFL.    Increased function with ADL's and IADL's.  Improve LEFS to:  20 %  limitation of function.    Normal gait pattern  on level and uneven surfaces community level distances for improved function in the community.     Normal reciprocal pattern on stairs for improved function to all levels of home.      Increase  R SLS to 15\"    Increase R knee AROM to 0/120 for improved function with car transfers.     Increase R L strength to WNL for improved function with chores.    Increase R LE flexibility to WFL.      I and compliant with HEP and proper: R LE care.      Rehab potential to achieve the above goals is good.    Patient is aware of diagnosis and prognosis and agrees with established plan of care and goals.    "     Plan  Skilled PT 1-2 x/week for 12 weeks( Until goals achieved, maximum rehab potential has been achieved, or patient has plateaued)  for:    Aquatics  _____  CP   __x__  Dry needling  ____  Education  __x__  Electrical Stimulation  ___x_  Gait training  __x__  HEP  __x__  HP  ____  Manual  __x__  Mechanical Traction  ____  NMR  __x__  Self-care/home management  __x__  Therapeutic Exercise   __x__  Therapeutic Activities  __x__    ____  Work Conditioning  ____  Re-assessment  __x__  Other  ____    X Indicates included in treatment plan    PT for Nu-step for functional mobility and soft tissue warm up for more efficient stretching, work on ROM, flexibility, strength, balance, closed chain, gait and stair activities for improved overall function.                 Subjective     Adult Risk Screening  There are no spiritual/cultural practices/values/needs that are important to know     Initial Fall Risk Screening:   Patient has not fallen in the last 6 months. Patient does not have a fear of falling. They do not need assistance with sitting, standing or walking. Does not need assistance walking in her home. They do not need assistance in an unfamiliar setting. The patient is not using an assistive device.     Domestic Violence Screen: Does not feel threatened or abused physically, emotionally or sexually. Do you feel UNSAFE?   The patient feels safe in the home.     Depression/Suicide Screening:   During the past 2 weeks, the patient has not felt down, depressed or hopeless.    During the past 2 weeks, the patient has not felt little interest or pleasure in doing things.    They do not have a risk of suicide.       Human Trafficking risk:  No    Key Learner:  self  Preferred Learning:  Printed Materials/Demonstration/Discussion  Learning Barriers:  none      Precautions:  Precautions  Precautions Comment: R TKR 2007, revision 9-19-23, L TKR 2009, R RTC repair and L shoulder labral tear, treated with injections,   "A-fib, depression, high cholesterol, see meds in chart.     Pain:  Pain Assessment: 0-10  Pain Score: 7  Pain Type: Surgical pain  Pain Location: Knee  Pain Orientation: Anterior, Inner, Outer (anterior thigh)  Pain Descriptors: Aching  Pain Frequency: Constant/continuous  Effect of Pain on Daily Activities: increased with prolonged standing and walking (Releived with ice and elevation/pain meds.)    Reason for visit:  Patient referred to outpatient PT after R TKR (revision) on 23,(original R TKR  doing well until fall ) home the next day with [facility] until two days ago, most recent ROM 0/85.  Compliant with HEP a few times/day, ice at home and elevation.    Referred by:     Nicko Nice  Follow up:   1 month    Prior Function:   Walking without device and driving up to surgery.      Function:    A and O x 3  Sleep:  difficulty with sleeping, prefers side lying,  instructed in proper postures.  ADL's:  No problem with showers with shower chair and has a RTS.  Has WW and straight cane, grab bars in shower.    Chores:  doing light cooking  Driving:  not driving, doing well with transfers.    Work:  retired  Recreation: [place] [facility] [place], Nu-step, machines.      Patient goal:  Decreased pain and increased function.     Patient is aware of diagnosis and prognosis.    Living Environment:  ranch with basement, first floor laundry, 2 access steps with rail, in-law suite in daughter's home.    Social Support:  lives with:  , daughter and  in main house.                                                      Objective   Posture:  min decreased WB R LE.    Gait/stairs:  ambulates with ww with min forward flexed posture, able to ambulate indoors without device with slow but good pattern, stairs with step to pattern with L LE WB with rails.     Balance:   R SLS:  8\"    ROM:  L knee ext/flex:  A: 0/AA:      MMT:   Hip flexors: 4-/5  ext: 4-/5  abd: 4-/5  add: 4-/5  Quad la  Hams: " "3-/5  DF: 4+/5  PF: 4+/5(NWB)    Flexibility:    Hams:  90/90: -22  Heelcords: 3  Hip flexors:  mod     Palpation:  Ecchymosis noted around R distal LE and knee and steri-strips present over incision, incision healing well.     Outcome Measures:  Other Measures  Lower Extremity Funtional Score (LEFS): 44 (44/64:  31% limitation of function)     OP EDUCATION:  Treatment:    Evaluation:  35'  Therapeutic exercise:  8'  Self-management of symptoms:  2'  Modalities:  CP to R knee supine with pillow under knee x 10'  **= HEP  NV= Next visit  np= not performed  nb= non-billable  G= group  HEP= discharged to HEP.     Therapeutic Exercise:  Nu-step(subjective taken/education):    NV  Stair R  knee flex/ext stretch:  NV  R hip flexor/calf stretch:  NV    LAQ:  NV  seated hams curl with t-band: NV    HS with belt:  10/10\"**  QS with towel roll under heel and knee:  15/5\"**  SAQ:  NV    NMR:    R SLS:  NV  R T-band TKE:  NV  Partial squats:  NV    R knee ROM:  A: /AA: 97    Modalities:    CP to R knee x 10' supine with bolster.     Education:  poc, anatomy, physiology, posture, body mechanics, safety awareness, HEP.  Reviewed proper sleeping postures/support.    Preferred learning:  pictures, demonstration.  Demonstrated good understanding.                                                                 Current Participants as of 10/6/2023      Name Type Comments Contact Info    Nicko Nice MD Referring Provider  [phone]    Signature pending    Adriana Solitario PT Physical Therapist  [phone]    Electronically signed by Adriana Solitario PT at 10/5/2023 1431 EDT          "

## 2023-10-05 NOTE — PROGRESS NOTES
"Physical Therapy    Physical Therapy Evaluation    Patient Name: Regina Tam  MRN: [id number]  Today's Date: 10/5/2023     (per information provided by  pre-cert team)    Visit #: 1  Insurance Reviewed (per information provided by  pre-cert team)  Authorization required:  N  Certification Period:  10-5-23/1-2-24  Preferred Name:  Regina  Script:   eval and treat 1-2/week for 4-6 weeks/cert for 12 weeks    Assessment  Patient with recent TKR, needs work on/Skilled PT for ROM, flexibility, strength, balance, closed chain, gait and stair activities for improved overall function.   Problems:    Pain:  __x_  Posture/Body mechanics deficits:  _x__  Decreased knowledge HEP:  _x__  Decreased knowledge of Precautions:  ___  Activity Limitations:   __x_  ADL's/IADL's/Self-care skills:  _x__  ROM/Joint Mobility:  __x_  Strength:  __x_  Decreased functional level:   __x_  Flexibility:  __x_  Tenderness/decreased soft tissue mobility:  __x_  Gait/Stairs:  _x__  Fall Risk:  ___  Balance:  __x_  Edema:  ___  Participation restrictions:  ___  Sensory:  ___  Transfers:  ___  Decreased knowledge of brace:   ___  Other:  ___    X Indicates included in problem list     Goals:      STG:  In two weeks.   Increased postural awareness.     Compliant with HEP    LTG: by discharge    Increased postural awareness and posture WFL.    Increased function with ADL's and IADL's.  Improve LEFS to:  20 %  limitation of function.    Normal gait pattern  on level and uneven surfaces community level distances for improved function in the community.     Normal reciprocal pattern on stairs for improved function to all levels of home.      Increase  R SLS to 15\"    Increase R knee AROM to 0/120 for improved function with car transfers.     Increase R L strength to WNL for improved function with chores.    Increase R LE flexibility to WFL.      I and compliant with HEP and proper: R LE care.      Rehab potential to achieve the above goals is " good.    Patient is aware of diagnosis and prognosis and agrees with established plan of care and goals.        Plan  Skilled PT 1-2 x/week for 12 weeks( Until goals achieved, maximum rehab potential has been achieved, or patient has plateaued)  for:    Aquatics  _____  CP   __x__  Dry needling  ____  Education  __x__  Electrical Stimulation  ___x_  Gait training  __x__  HEP  __x__  HP  ____  Manual  __x__  Mechanical Traction  ____  NMR  __x__  Self-care/home management  __x__  Therapeutic Exercise   __x__  Therapeutic Activities  __x__  US  ____  Work Conditioning  ____  Re-assessment  __x__  Other  ____    X Indicates included in treatment plan    PT for Nu-step for functional mobility and soft tissue warm up for more efficient stretching, work on ROM, flexibility, strength, balance, closed chain, gait and stair activities for improved overall function.                 Subjective     Adult Risk Screening  There are no spiritual/cultural practices/values/needs that are important to know     Initial Fall Risk Screening:   Patient has not fallen in the last 6 months. Patient does not have a fear of falling. They do not need assistance with sitting, standing or walking. Does not need assistance walking in her home. They do not need assistance in an unfamiliar setting. The patient is not using an assistive device.   Fall risk:  none    Domestic Violence Screen: Does not feel threatened or abused physically, emotionally or sexually. Do you feel UNSAFE?   The patient feels safe in the home.     Depression/Suicide Screening:   During the past 2 weeks, the patient has not felt down, depressed or hopeless.    During the past 2 weeks, the patient has not felt little interest or pleasure in doing things.    They do not have a risk of suicide.       Human Trafficking risk:  No    Key Learner:  self  Preferred Learning:  Printed Materials/Demonstration/Discussion  Learning Barriers:  none       Precautions:  Precautions  Precautions Comment: R TKR 2007, revision 9-19-23, L TKR 2009, R RTC repair and L shoulder labral tear, treated with injections,  A-fib, depression, high cholesterol, see meds in chart.     Pain:  Pain Assessment: 0-10  Pain Score: 7  Pain Type: Surgical pain  Pain Location: Knee  Pain Orientation: Anterior, Inner, Outer (anterior thigh)  Pain Descriptors: Aching  Pain Frequency: Constant/continuous  Effect of Pain on Daily Activities: increased with prolonged standing and walking (Releived with ice and elevation/pain meds.)    Reason for visit:  Patient referred to outpatient PT after R TKR (revision) on 9-19-23,(original R TKR 2007 doing well until fall ) home the next day with [facility] until two days ago, most recent ROM 0/85.  Compliant with HEP a few times/day, ice at home and elevation.    Referred by:     Nicko Nice  Follow up:   1 month    Prior Function:   Walking without device and driving up to surgery.      Function:    A and O x 3  Sleep:  difficulty with sleeping, prefers side lying,  instructed in proper postures.  ADL's:  No problem with showers with shower chair and has a RTS.  Has WW and straight cane, grab bars in shower.    Chores:  doing light cooking  Driving:  not driving, doing well with transfers.    Work:  retired  Recreation: [facility], Nu-step, machines.      Patient goal:  Decreased pain and increased function.     Patient is aware of diagnosis and prognosis.    Living Environment:  ranch with basement, first floor laundry, 2 access steps with rail, in-law suite in daughter's home.    Social Support:  lives with:  , daughter and  in main house.                                                      Objective   Posture:  min decreased WB R LE.    Gait/stairs:  ambulates with ww with min forward flexed posture, able to ambulate indoors without device with slow but good pattern, stairs with step to pattern with L LE WB with  "rails.     Balance:   R SLS:  8\"    ROM:  L knee ext/flex:  A: 0/AA:      MMT:   Hip flexors: 4-/5  ext: 4-/5  abd: 4-/5  add: 4-/5  Quad la  Hams: 3-/5  DF: 4+/5  PF: 4+/5(NWB)    Flexibility:    Hams:  90/90: -22  Heelcords: 3  Hip flexors:  mod     Palpation:  Ecchymosis noted around R distal LE and knee and steri-strips present over incision, incision healing well.     Outcome Measures:  Other Measures  Lower Extremity Funtional Score (LEFS): 44 (44/64:  31% limitation of function)     OP EDUCATION:  Treatment:    Evaluation:  35'  Therapeutic exercise:  8'  Self-management of symptoms:  2'  Modalities:  CP to R knee supine with pillow under knee x 10'  **= HEP  NV= Next visit  np= not performed  nb= non-billable  G= group  HEP= discharged to [facility].     Therapeutic Exercise:  Nu-step(subjective taken/education):    NV  Stair R  knee flex/ext stretch:  NV  R hip flexor/calf stretch:  NV    LAQ:  NV  seated hams curl with t-band: NV    HS with belt:  10/10\"**  QS with towel roll under heel and knee:  15/5\"**  SAQ:  NV    NMR:    R SLS:  NV  R T-band TKE:  NV  Partial squats:  NV    R knee ROM:  A: /AA: 97    Modalities:    CP to R knee x 10' supine with bolster.     Education:  poc, anatomy, physiology, posture, body mechanics, safety awareness, HEP.  Reviewed proper sleeping postures/support.    Preferred learning:  pictures, demonstration.  Demonstrated good understanding.                                                          "

## 2023-10-05 NOTE — Clinical Note
October 5, 2023    Adriana Solitario, CLARE  [address] [address] [facility]  [place] [address]    Patient: Regina Tam   YOB: 1945   Date of Visit: 10/5/2023       Dear No referring provider defined for this encounter.    The attached plan of care is being sent to you because your patient’s medical reimbursement requires that you certify the plan of care. Your signature is required to allow uninterrupted insurance coverage.      You may indicate your approval by signing below and faxing this form back to us at Dept Fax: [phone].    Please call Dept: [phone] with any questions or concerns.    Thank you for this referral,        Adriana Solitario PT  [facility] [address] [facility]  [address] [address] [address]  [phone]    Payer: Payor: MEDICARE / Plan: MEDICARE PART A AND B / Product Type: *No Product type* /                                                                         Date:     Dear Adriana Solitario PT,     Re: Ms. Regina Tam, MRN:[id number]    I certify that I have reviewed the attached plan of care and it is medically necessary for Ms. Regina Tam (1945) who is under my care.          ______________________________________                    _________________  Provider name and credentials                                           Date and time                                                                                           Plan of Care 10/5/23   Effective from: 10/5/2023  Effective to: 1/2/2024    Plan ID: [id number]            Participants as of Finalize on 10/5/2023    Name Type Comments Contact Info    Adriana Solitario PT Physical Therapist  [phone]    Nicko Nice MD Consulting Physician  [phone]       Last Plan Note     Author: Adriana Solitario PT Status: Signed Last edited: 10/5/2023  1:30 PM       Physical Therapy    Physical Therapy Evaluation    Patient Name: Regina CASTRO  "Yonatan  MRN: [id number]  Today's Date: 10/5/2023     (per information provided by  pre-cert team)    Visit #: 1  Insurance Reviewed (per information provided by  pre-cert team)  Authorization required:  N  Certification Period:  10-5-23/1-2-24  Preferred Name:  Regina  Script:   eval and treat 1-2/week for 4-6 weeks/cert for 12 weeks    Assessment  Patient with recent TKR, needs work on/Skilled PT for ROM, flexibility, strength, balance, closed chain, gait and stair activities for improved overall function.   Problems:    Pain:  __x_  Posture/Body mechanics deficits:  _x__  Decreased knowledge HEP:  _x__  Decreased knowledge of Precautions:  ___  Activity Limitations:   __x_  ADL's/IADL's/Self-care skills:  _x__  ROM/Joint Mobility:  __x_  Strength:  __x_  Decreased functional level:   __x_  Flexibility:  __x_  Tenderness/decreased soft tissue mobility:  __x_  Gait/Stairs:  _x__  Fall Risk:  ___  Balance:  __x_  Edema:  ___  Participation restrictions:  ___  Sensory:  ___  Transfers:  ___  Decreased knowledge of brace:   ___  Other:  ___    X Indicates included in problem list     Goals:      STG:  In two weeks.   Increased postural awareness.     Compliant with HEP    LTG: by discharge    Increased postural awareness and posture WFL.    Increased function with ADL's and IADL's.  Improve LEFS to:  20 %  limitation of function.    Normal gait pattern  on level and uneven surfaces community level distances for improved function in the community.     Normal reciprocal pattern on stairs for improved function to all levels of home.      Increase  R SLS to 15\"    Increase R knee AROM to 0/120 for improved function with car transfers.     Increase R L strength to WNL for improved function with chores.    Increase R LE flexibility to WFL.      I and compliant with HEP and proper: R LE care.      Rehab potential to achieve the above goals is good.    Patient is aware of diagnosis and prognosis and agrees with established " plan of care and goals.        Plan  Skilled PT 1-2 x/week for 12 weeks( Until goals achieved, maximum rehab potential has been achieved, or patient has plateaued)  for:    Aquatics  _____  CP   __x__  Dry needling  ____  Education  __x__  Electrical Stimulation  ___x_  Gait training  __x__  HEP  __x__  HP  ____  Manual  __x__  Mechanical Traction  ____  NMR  __x__  Self-care/home management  __x__  Therapeutic Exercise   __x__  Therapeutic Activities  __x__    ____  Work Conditioning  ____  Re-assessment  __x__  Other  ____    X Indicates included in treatment plan    PT for Nu-step for functional mobility and soft tissue warm up for more efficient stretching, work on ROM, flexibility, strength, balance, closed chain, gait and stair activities for improved overall function.                 Subjective     Adult Risk Screening  There are no spiritual/cultural practices/values/needs that are important to know     Initial Fall Risk Screening:   Patient has not fallen in the last 6 months. Patient does not have a fear of falling. They do not need assistance with sitting, standing or walking. Does not need assistance walking in her home. They do not need assistance in an unfamiliar setting. The patient is not using an assistive device.     Domestic Violence Screen: Does not feel threatened or abused physically, emotionally or sexually. Do you feel UNSAFE?   The patient feels safe in the home.     Depression/Suicide Screening:   During the past 2 weeks, the patient has not felt down, depressed or hopeless.    During the past 2 weeks, the patient has not felt little interest or pleasure in doing things.    They do not have a risk of suicide.       Human Trafficking risk:  No    Key Learner:  self  Preferred Learning:  Printed Materials/Demonstration/Discussion  Learning Barriers:  none      Precautions:  Precautions  Precautions Comment: R TKR 2007, revision 9-19-23, L TKR 2009, R RTC repair and L shoulder labral tear,  "treated with injections,  A-fib, depression, high cholesterol, see meds in chart.     Pain:  Pain Assessment: 0-10  Pain Score: 7  Pain Type: Surgical pain  Pain Location: Knee  Pain Orientation: Anterior, Inner, Outer (anterior thigh)  Pain Descriptors: Aching  Pain Frequency: Constant/continuous  Effect of Pain on Daily Activities: increased with prolonged standing and walking (Releived with ice and elevation/pain meds.)    Reason for visit:  Patient referred to outpatient PT after R TKR (revision) on 9-19-23,(original R TKR 2007 doing well until fall ) home the next day with [facility] until two days ago, most recent ROM 0/85.  Compliant with HEP a few times/day, ice at home and elevation.    Referred by:     Nicko Nice  Follow up:   1 month    Prior Function:   Walking without device and driving up to surgery.      Function:    A and O x 3  Sleep:  difficulty with sleeping, prefers side lying,  instructed in proper postures.  ADL's:  No problem with showers with shower chair and has a RTS.  Has WW and straight cane, grab bars in shower.    Chores:  doing light cooking  Driving:  not driving, doing well with transfers.    Work:  retired  Recreation: wants to [facility] [employer], Nu-step, machines.      Patient goal:  Decreased pain and increased function.     Patient is aware of diagnosis and prognosis.    Living Environment:  ranch with basement, first floor laundry, 2 access steps with rail, in-law suite in daughter's home.    Social Support:  lives with:  , daughter and  in main house.                                                      Objective   Posture:  min decreased WB R LE.    Gait/stairs:  ambulates with ww with min forward flexed posture, able to ambulate indoors without device with slow but good pattern, stairs with step to pattern with L LE WB with rails.     Balance:   R SLS:  8\"    ROM:  L knee ext/flex:  A: 0/AA:      MMT:   Hip flexors: 4-/5  ext: 4-/5  abd: 4-/5  add: " "4-/5  Quad la  Hams: 3-/5  DF: 4+/5  PF: 4+/5(NWB)    Flexibility:    Hams:  90/90: -22  Heelcords: 3  Hip flexors:  mod     Palpation:  Ecchymosis noted around R distal LE and knee and steri-strips present over incision, incision healing well.     Outcome Measures:  Other Measures  Lower Extremity Funtional Score (LEFS): 44 (44/64:  31% limitation of function)     OP EDUCATION:  Treatment:    Evaluation:  35'  Therapeutic exercise:  8'  Self-management of symptoms:  2'  Modalities:  CP to R knee supine with pillow under knee x 10'  **= HEP  NV= Next visit  np= not performed  nb= non-billable  G= group  HEP= discharged to [facility].     Therapeutic Exercise:  Nu-step(subjective taken/education):    NV  Stair R  knee flex/ext stretch:  NV  R hip flexor/calf stretch:  NV    LAQ:  NV  seated hams curl with t-band: NV    HS with belt:  10/10\"**  QS with towel roll under heel and knee:  15/5\"**  SAQ:  NV    NMR:    R SLS:  NV  R T-band TKE:  NV  Partial squats:  NV    R knee ROM:  A: /AA: 97    Modalities:    CP to R knee x 10' supine with bolster.     Education:  poc, anatomy, physiology, posture, body mechanics, safety awareness, HEP.  Reviewed proper sleeping postures/support.    Preferred learning:  pictures, demonstration.  Demonstrated good understanding.                                                                 Current Participants as of 10/5/2023    Name Type Comments Contact Info    Adriana Solitario, CLARE Physical Therapist  [phone]    Signature pending    Nicko Nice MD Consulting Physician  [phone]    Signature pending      "

## 2023-10-05 NOTE — LETTER
October 5, 2023     Patient: Regina Tam   YOB: 1945   Date of Visit: 10/5/2023       To Whom It May Concern:    It is my medical opinion that Regina Tam {Work release (duty restriction):56196}.    If you have any questions or concerns, please don't hesitate to call.         Sincerely,        Adriana Solitario, PT    CC: No Recipients

## 2023-10-05 NOTE — LETTER
October 5, 2023     Patient: Regina Tam   YOB: 1945   Date of Visit: 10/5/2023       To Whom it May Concern:    Regina Tam was seen in my clinic on 10/5/2023. She {Return to school/sport:63633}.    If you have any questions or concerns, please don't hesitate to call.         Sincerely,          Adriana Solitario, PT        CC: No Recipients

## 2023-10-09 ENCOUNTER — ANTICOAGULATION - WARFARIN VISIT (OUTPATIENT)
Dept: CARDIOLOGY | Facility: CLINIC | Age: 78
End: 2023-10-09
Payer: MEDICARE

## 2023-10-09 DIAGNOSIS — I48.91 ATRIAL FIBRILLATION, UNSPECIFIED TYPE (MULTI): Primary | ICD-10-CM

## 2023-10-09 LAB
POC INR: 2.3
POC PROTHROMBIN TIME: NORMAL

## 2023-10-09 PROCEDURE — 99211 OFF/OP EST MAY X REQ PHY/QHP: CPT | Performed by: FAMILY MEDICINE

## 2023-10-09 PROCEDURE — 85610 PROTHROMBIN TIME: CPT | Mod: QW

## 2023-10-09 NOTE — PROGRESS NOTES
Patient identification verified with 2 identifiers.    Location: [facility] - [address] [place] [address] [phone]     Referring Physician: Jillian Vizcarra  Enrollment/ Re-enrollment date: 23   INR Goal: 2.0-3.0  INR monitoring is per [facility] protocol.  Anticoagulation Medication: warfarin  Indication: atrial fibrillation    Subjective   Bleeding signs/symptoms: No    Bruising: No   Major bleeding event: No  Thrombosis signs/symptoms: No  Thromboembolic event: No  Missed doses: No  Extra doses: No  Medication changes: No  Dietary changes: No  Change in health: No  Change in activity: No  Alcohol: No  Other concerns: No    Upcoming Surgeries:  Does the Patient Have any upcoming surgeries that require interruption in anticoagulation therapy? no  Does the patient require bridging? no      Anticoagulation Summary  As of 10/9/2023      INR goal:  2.0-3.0   TTR:  26.0 % (2.6 wk)   INR used for dosin.30 (10/9/2023)   Weekly warfarin total:  37.5 mg               Assessment/Plan   Therapeutic     1. New dose: no change    2. Next INR: 1 month      Education provided to patient during the visit:  Patient instructed to call in interim with questions, concerns and changes.         What Type Of Note Output Would You Prefer (Optional)?: Standard Output How Severe Is Your Skin Lesion?: mild Has Your Skin Lesion Been Treated?: not been treated Is This A New Presentation, Or A Follow-Up?: Skin Lesions

## 2023-10-11 ENCOUNTER — TREATMENT (OUTPATIENT)
Dept: PHYSICAL THERAPY | Facility: CLINIC | Age: 78
End: 2023-10-11
Payer: MEDICARE

## 2023-10-11 DIAGNOSIS — M17.11 ARTHRITIS OF RIGHT KNEE: ICD-10-CM

## 2023-10-11 DIAGNOSIS — R26.89 DECREASED FUNCTIONAL MOBILITY: ICD-10-CM

## 2023-10-11 PROCEDURE — 97112 NEUROMUSCULAR REEDUCATION: CPT | Mod: GP,CQ

## 2023-10-11 PROCEDURE — 97110 THERAPEUTIC EXERCISES: CPT | Mod: GP,CQ

## 2023-10-11 ASSESSMENT — PAIN SCALES - GENERAL: PAINLEVEL_OUTOF10: 6

## 2023-10-11 ASSESSMENT — PAIN - FUNCTIONAL ASSESSMENT: PAIN_FUNCTIONAL_ASSESSMENT: 0-10

## 2023-10-11 ASSESSMENT — PAIN DESCRIPTION - DESCRIPTORS: DESCRIPTORS: ACHING

## 2023-10-11 NOTE — PROGRESS NOTES
Physical Therapy    Physical Therapy Treatment    Patient Name: Regina Tam  MRN: [id number]  Today's Date: 10/11/2023  Time Calculation  Start Time: 0140  Stop Time: 0230  Time Calculation (min): 50 min    Insurance:    Visit #: 2  Insurance Reviewed (per information provided by  pre-cert team)  Authorization required:  N  Certification Period:  10-5-23/1-2-24  Preferred Name:  Regina  Script:   eval and treat 1-2/week for 4-6 weeks/cert for 12 weeks       Assessment:Patient would continue to benefit from skilled PT to address functional deficits and restore ability to complete required and desired activities. Patient will benefit from skilled therapy to address  remaining functional, objective and subjective deficits to allow them to return to full independence with  ADL       Plan:INCREASE STEP UP   Continue with poc as documented in the [facility] [facility] [employer] system.      PT AGREES WITH NOTE AND BILLING    Current Problem  1. Arthritis of right knee  Follow Up In Physical Therapy      2. Decreased functional mobility  Follow Up In Physical Therapy          Subjective  reports she is having her most discomfort in the shin area.  Reports she is Doing her  HEP  FUNCTION = reports she is walking in the home without device. Back to some cooking     PrecautionsPrecautions Comment: R TKR 2007, revision 9-19-23, L TKR 2009, R RTC repair and L shoulder labral tear, treated with injections,  A-fib, depression, high cholesterol, see meds in chart. No fall risk.         Pain  Pain Assessment: 0-10  Pain Score: 6  Pain Location: Knee  Pain Orientation: Anterior  Pain Descriptors: Aching    Objective   KNEE FLEXION SEATED 96 DEGREE  PERFORMS SLR WITH NO LAG        Treatments:    **= HEP  NV= Next visit  np= not performed  nb= non-billable  G= group  HEP= discharged to [facility].      Therapeutic Exercise:  30 MIN  Nu-step(subjective taken/education):    10 MIN LEV   Stair R  knee flex/ext stretch:  20SEC 3X  R hip flexor/calf stretch:   "20sec 3x  Step ups 4'' 10x F/L  SIT TO STAND AIREX   ON CHAIR 5X2     LAQ:  2# 15X2  seated hams curl with t-band: GREEN 15X2**     HS with belt:  10/10\"**  QS with towel roll under heel and knee:  15/5\"**  SAQ:  2# 15X2       NMR:    10 MIN  R SLS:  10SEC 5X  R T-band TKE:  GREEN 15X2 **  Partial squats:  NV   SIDE STEPPING AND FOR/BACKWARD AT ENRIQUE BAR. 4 SETX  R knee ROM:  A: /AA: 96    CP 10 MIN TO RT KNEE     "

## 2023-10-13 ENCOUNTER — TREATMENT (OUTPATIENT)
Dept: PHYSICAL THERAPY | Facility: CLINIC | Age: 78
End: 2023-10-13
Payer: MEDICARE

## 2023-10-13 DIAGNOSIS — R26.89 DECREASED FUNCTIONAL MOBILITY: ICD-10-CM

## 2023-10-13 DIAGNOSIS — M17.11 ARTHRITIS OF RIGHT KNEE: ICD-10-CM

## 2023-10-13 PROCEDURE — 97112 NEUROMUSCULAR REEDUCATION: CPT | Mod: GP | Performed by: PHYSICAL THERAPIST

## 2023-10-13 PROCEDURE — 97110 THERAPEUTIC EXERCISES: CPT | Mod: GP | Performed by: PHYSICAL THERAPIST

## 2023-10-13 NOTE — PROGRESS NOTES
Physical Therapy  Physical Therapy Progress Note    Patient Name Regina Tam  MRN: [id number]  Today's Date: 10/13/23    Preferred Name:  Regina  Time Calculation  Start Time: 0230  Stop Time: 0320  Time Calculation (min): 50 min    Insurance:    Insurance Reviewed (per information provided by  pre-cert team)  Authorization required:  N  Certification Period:  10-5-23/1-2-24    Script:   eval and treat 1-2/week for 4-6 weeks/cert for 12 weeks    Assessment:  Patient tolerated treatment well, did well with progression this date.  Needs continued work on/skilled PT for remaining functional, objective and subjective deficits to allow them to return to prior /optimal level of function with ADLs./ROM, closed chain and functional strength and gait quality.    Flexion ROM slowly improving, needs emphasis.    Skilled care:  exercise progression, education, measurments.      Plan:    Continue with poc as documented in the legacy system.     Continue to progress per poc:   Focus on flexion ROM and progress with functional and closed chain strength.     Therapy Diagnoses:   1. Arthritis of right knee  Follow Up In Physical Therapy      2. Decreased functional mobility  Follow Up In Physical Therapy          Subjective:   Onset Date:  9-19-23    Patient reports:  that she is having difficulty due to painful bruising R ant and medial distal LE and pain with pillow or other LE touching it.  Reviewed sleeping postures.    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        Pain:  6/10  Location/Type of pain:  aching R knee and medial and anterior distal LE.    What increases pain: walking or standing for prolonged periods or trying to sleep at night.      What decreases pain:  ice/pain meds. To help with sleep at night.    HEP  "compliance/understanding:  yes    Objective:   Objective Measurements:    Function:   painful sleeping  ROM:  R knee ext/flex:  A:  /AA:  104  Gait: without device with min R antalgic limp and decreased step length.    Balance: R SLS:  5\"    Treatment:   **= HEP  NV= Next visit  np= not performed  nb= non-billable  G= group HEP= discharged to [facility]     Therapeutic Exercise:  Minutes: 23  Nu-step: (subjective taken) Lev 3 x 10'  Stair R knee flexion stretch:  10/10\"  Stair R hams and hip flexor/calf stretch:  3/30\" ea.     HS with belt:  10/10\"    QS with towel under heel and knee:  x 15/5\"      NMR:    Minutes: 15  R SLS:  x 5/ 5\" max  Step ups for/lat:  6\" x 20 ea. B UE support  Step downs: 2\" x 15 B UE support  Airex Partial squats:  x 15 B UE support cues for form.      Manual:    Minutes:   PROM into R knee flexion NV prn.    Modalities:    Untimed: CP to R knee supine with bolster x 10'  Minutes:  10'    Education:  exercise progression  "

## 2023-10-17 ENCOUNTER — TREATMENT (OUTPATIENT)
Dept: PHYSICAL THERAPY | Facility: CLINIC | Age: 78
End: 2023-10-17
Payer: MEDICARE

## 2023-10-17 DIAGNOSIS — R26.89 DECREASED FUNCTIONAL MOBILITY: ICD-10-CM

## 2023-10-17 DIAGNOSIS — M17.11 ARTHRITIS OF RIGHT KNEE: ICD-10-CM

## 2023-10-17 PROCEDURE — 97112 NEUROMUSCULAR REEDUCATION: CPT | Mod: GP,CQ

## 2023-10-17 PROCEDURE — 97110 THERAPEUTIC EXERCISES: CPT | Mod: GP,CQ

## 2023-10-17 NOTE — PROGRESS NOTES
Physical Therapy  Physical Therapy Progress Note    Patient Name Regina Tam  MRN: [id number]  Today's Date: 10/17/23    Preferred Name:  Regina  Time Calculation  Start Time: 0100  Stop Time: 0155  Time Calculation (min): 55 min    Insurance:   VISIT  4#  Insurance Reviewed (per information provided by  pre-cert team)  Authorization required:  N  Certification Period:  10-5-23/1-2-24    Script:   eval and treat 1-2/week for 4-6 weeks/cert for 12 weeks    Assessment:  Patient tolerated treatment well, did well with progression this date with function in stair climbing. Still requires hand support with steps Needs continued work on  ROM strength and knee stability skilled PT for remaining functional, objective and subjective deficits to allow her   to return to prior /optimal level of function with ADLs./ROM, closed chain and functional strength and gait quality.    Flexion ROM slowly improving, needs emphasis on ROM and knee stability Regina Tam is a 77 y.o. year old female patient with   referred for Follow Up In Physical Therapy.  Has tender ness along Itband   Skilled care:  exercise progression, , measurments.      Plan  Bosu step ups  Continue with poc as documented in the legacy system.     Continue to progress per poc:   .     Therapy Diagnoses:   1. Arthritis of right knee  Follow Up In Physical Therapy      2. Decreased functional mobility  Follow Up In Physical Therapy          Subjective:   Onset Date:  9-19-23    Patient reports:   THE KNEE DOES NOT HURT IT ACHES. STILL GETTING ONLY 2 HOURS OF SLEEP AT A TIME. HOME EX. ARE GOING WELL.  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        Pain:  6/10  Location/Type of pain:  aching R knee and medial and anterior distal LE.    What increases pain: walking  "or standing for prolonged periods or trying to sleep at night.      What decreases pain:  ice/pain meds. To help with sleep at night.    HEP compliance/understanding:  yes    Objective:   Objective Measurements:    Function:   painful sleeping  ROM:  R knee ext/flex:  A: 0 /AA:  105  Gait:   WALKING WITH LONGER STRIDE   Balance: R SLS:  10\"FINGER SUPPORT    Treatment:   **= HEP  NV= Next visit  np= not performed  nb= non-billable  G= group HEP= discharged to HEP     Therapeutic Exercise:  Minutes: 20  Nu-step: (subjective taken) Lev 3 x 10'  Stair R knee flexion stretch:  10/10\"  Stair R hams and hip flexor/calf stretch:  3/30\" ea.   CABLE TKE  pl 3# 15x2  HS with belt:  10/10\"    QS with towel under heel and knee:  x 15/5\"      NMR:    Minutes: 25  R SLS:  x10\" max  SL MULTI HIP 10X **  Step ups for/lat:  6\" x 20 ea. B UE support  Step down  4\" x 15 B UE support  Airex Partial squats:  x 15 B UE support cues for form.    AIREX CALF /TOE RAISES  20X    Modalities:    Untimed: CP to R knee/IT band  supine with bolster x 10'  Minutes:  10'    Education:  exercise progression  "

## 2023-10-18 ENCOUNTER — TREATMENT (OUTPATIENT)
Dept: PHYSICAL THERAPY | Facility: CLINIC | Age: 78
End: 2023-10-18
Payer: MEDICARE

## 2023-10-18 DIAGNOSIS — R26.89 DECREASED FUNCTIONAL MOBILITY: ICD-10-CM

## 2023-10-18 DIAGNOSIS — M17.11 ARTHRITIS OF RIGHT KNEE: ICD-10-CM

## 2023-10-18 PROCEDURE — 97014 ELECTRIC STIMULATION THERAPY: CPT | Mod: GP | Performed by: PHYSICAL THERAPIST

## 2023-10-18 PROCEDURE — 97110 THERAPEUTIC EXERCISES: CPT | Mod: GP | Performed by: PHYSICAL THERAPIST

## 2023-10-18 NOTE — PROGRESS NOTES
Physical Therapy  Physical Therapy Progress Note    Patient Name Regina Tam  MRN: [id number]  Today's Date: 10/18/23  Time Calculation  Start Time: 0445  Stop Time: 0530  Time Calculation (min): 45 min    Preferred Name:  Regina    Insurance:    (per information provided by  pre-cert team)  [facility]  Visit #: 5  Certification dates:  10-5-23/1-2-24     Script:  eval and treat 1-2/week for 4-6 weeks/cert for 12 weeks     Assessment:  Patient tolerated treatment well, did well with progression this date.  Needs continued work on/skilled PT for remaining functional, objective and subjective deficits to allow them to return to prior /optimal level of function with ADLs./flexion ROM and closed chain and functional strength.  Patient is progressing with good gait pattern despite flare up of pain.     Skilled care:  exercise modification, education, modalities, ROM measurement.       Plan:    Continue with poc as documented in the legacy system.     Continue to progress per poc:   NV add Resume closed chain strengthening next visit and progress HEP as appropriate.     Therapy Diagnoses:   1. Arthritis of right knee  Follow Up In Physical Therapy      2. Decreased functional mobility  Follow Up In Physical Therapy          Subjective:   Onset Date:  9-19-23    Patient reports:  that she is very sore today and she was sore last night and did not sleep well. Patient has some R gluteal muscle soreness as well.     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      Pain:  7/10  Location/Type of pain:  generalized R knee pain aching  What increases pain: standing/walking    What decreases pain:  ice    HEP compliance/understanding:  yes    Objective:   Objective Measurements:    Function:   poor sleep last night  Gait: good gait pattern  "despite increased pain, slower mayuri, but good pattern and symmetrical WB.    ROM:  R knee A:  0/AA:  107      Treatment:   **= HEP  NV= Next visit  np= not performed  nb= non-billable  G= group HEP= discharged to [facility]     Therapeutic Exercise:  Minutes:  25  Nu-step: (subjective taken) Lev 3 x 10'  Stair R knee flexion stretch:  10/10\"  Stair R hams and hip flexor/calf stretch:  3/30\" ea.   HS with belt:  10/10\"    QS with towel under heel and knee:  x 15/5\"      Modalities:    Untimed:  Minutes: 15  IFC/CP to R knee x 15' supine with bolster//40%/Intensity:  sensory stim/patient given controller.   Education:  proper exercise during a flare up    "

## 2023-10-24 ENCOUNTER — TREATMENT (OUTPATIENT)
Dept: PHYSICAL THERAPY | Facility: CLINIC | Age: 78
End: 2023-10-24
Payer: MEDICARE

## 2023-10-24 DIAGNOSIS — R26.89 DECREASED FUNCTIONAL MOBILITY: ICD-10-CM

## 2023-10-24 DIAGNOSIS — M17.11 ARTHRITIS OF RIGHT KNEE: ICD-10-CM

## 2023-10-24 PROCEDURE — 97112 NEUROMUSCULAR REEDUCATION: CPT | Mod: GP | Performed by: PHYSICAL THERAPIST

## 2023-10-24 PROCEDURE — 97110 THERAPEUTIC EXERCISES: CPT | Mod: GP | Performed by: PHYSICAL THERAPIST

## 2023-10-24 NOTE — PROGRESS NOTES
Physical Therapy  Physical Therapy Progress Note    Patient Name Regina Fitzgeraldymmer  vs Regina Fitzgeraldymmer (PREFNAME is patients preferred name ex: Brie vs Johanny, if patient does not have preferred name, it will pull full name in; PSR can enter preferred name into registration)  MRN: [id number]  Today's Date: 10/24/23  Time Calculation  Start Time: 0400  Stop Time: 0505  Time Calculation (min): 65 min    Insurance:    (per information provided by  pre-cert team)  [facility]  Visit #: 6  Certification dates:  10-5-23/1-2-24     Script:  eval and treat 1-2/week for 4-6 weeks/cert for 12 weeks      Assessment:  Patient tolerated treatment well, did well with progression this date.  Needs continued work on/skilled PT for remaining functional, objective and subjective deficits to allow them to return to prior /optimal level of function with ADLs./flexion ROM and closed chain and functional strength.   Patient is progressing with function/goals: good gait pattern and improved function on stairs.   Skilled care:  exercise and HEP progression and cues for exercises.     Plan:    Continue with poc as documented in the legacy system.     Continue to progress per poc:   NV add add step downs.    Therapy Diagnoses:   1. Arthritis of right knee  Follow Up In Physical Therapy      2. Decreased functional mobility  Follow Up In Physical Therapy          Subjective:   Onset Date:  9-19-23    Patient reports:  that the R lateral thigh is really sore today.  Patient reports that she slept well the last few nights.      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       Pain:  6/10  Location/Type of pain:  lateral R thigh ache. Medial R knee pain with sleeping on the R side.  Does not tolerate pillow between her knees.  Instructed in desensitization  "techniques.    What increases pain: unsure    What decreases pain:  ice    HEP compliance/understanding:  yes    Objective:   Objective Measurements:    Balance: R SLS:  5\"  ROM:  R knee ext/flexion:  A:  0/AA:  107  P:  107(hamstring cramping with AAROM)    Treatment:   **= HEP  NV= Next visit  np= not performed  nb= non-billable  G= group HEP= discharged to HEP   Access Code: [email or web address]    Therapeutic Exercise:  Minutes: 23  Nu-step: (subjective taken) Lev 3 x 10'  Stair R knee flexion stretch:  10/10\"  Stair R hams and hip flexor/calf stretch:  3/30\" ea. Cues for form    HS with belt:  10/10\"    Supine IT band stretch:  3/30\"**    NMR:    Minutes:  25 minutes  R SLS:  x 5/'5\" max.    Step ups For/Lat:  6\" B UE support:  x 20 ea.  Step downs:  NV  Steamboats OKC/CKC:  NV   Airex partial squats:  x 20   Airex light UE support marching/alt hip abd/ext:  x 20 ea.     Manual:  PROM into R knee flexion 10\"x 5  Minutes: 5 minutes    Modalities:    Untimed: CP to R knee supine with bolster  Minutes: 10    Education:  exercise progression  HEP Progression:    Access Code: [email or web address]  URL: [email or web address]  Date: 10/24/2023  Prepared by: Adriana Solitario    Exercises  - Supine ITB Stretch  - 1 x daily - 7 x weekly - 1 sets - 3 reps - 30 second hold    "

## 2023-10-26 ENCOUNTER — TREATMENT (OUTPATIENT)
Dept: PHYSICAL THERAPY | Facility: CLINIC | Age: 78
End: 2023-10-26
Payer: MEDICARE

## 2023-10-26 DIAGNOSIS — R26.89 DECREASED FUNCTIONAL MOBILITY: ICD-10-CM

## 2023-10-26 DIAGNOSIS — M17.11 ARTHRITIS OF RIGHT KNEE: ICD-10-CM

## 2023-10-26 PROCEDURE — 97110 THERAPEUTIC EXERCISES: CPT | Mod: GP,CQ,KX

## 2023-10-26 PROCEDURE — 97112 NEUROMUSCULAR REEDUCATION: CPT | Mod: GP,CQ,KX

## 2023-10-26 NOTE — PROGRESS NOTES
Physical Therapy  Physical Therapy Progress Note    Patient Name Timothy Tam   MRN: [id number]  Today's Date: 10/26/23  Time Calculation  Start Time: 0230  Stop Time: 0330  Time Calculation (min): 60 min    Insurance:  per information provided by  pre-cert team)  [facility]  Visit #: 7  Certification dates:  10-5-23/1-2-24      Script:  eval and treat 1-2/week for 4-6 weeks/cert for 12 weeks        Therapy Diagnoses:   1. Arthritis of right knee  Follow Up In Physical Therapy      2. Decreased functional mobility  Follow Up In Physical Therapy          General:  Reason for visit:  right knee surgery   Referred by: Dr. Tex Killian MD appt:   6weeks from 10/26/23  Preferred Name:  timothy  Script:  :  eval and treat 1-2/week for 4-6 weeks/cert for 12 weeks       Onset Date:  9/19/23    Assessment:  Patient tolerated treatment well, did well with progression this date with TKE  Needs continued work on strength and knee stability/skilled PT for remaining functional, objective and subjective deficits to allow them to return to prior /optimal level of function with ADLs./  Patient is progressing with function/goals: with single leg balance,increase knee flexion  Skilled care:  reviewed knee flexion /extension stretch for home and increaser knee stability with single leg multi hip    Plan:    Continue with poc as documented in the legacy system.     Continue to progress per poc:   NV add increase step downs     Subjective:   Patient reports:   she was very sore after last session that lasted several days from the stretches. She is on antibiotics for possible infection.    Have you fallen since last visit:  no        Precautions: no fall risk  R TKR 2007, revision 9-19-23, L TKR 2009, R RTC repair and L shoulder labral tear, treated with injections,  A-fib, depression, high cholesterol, see meds in chart.    Pain:  2/10  Location/Type of pain:  right knee    HEP compliance/understanding:  yes    Objective:   Objective  "Measurements:    Function:   getting in and out of the car goes well, and no problem with driving  Gait :ambulating without device   Balance:  progressed with single leg balance with multi hip,   ROM:  knee flexion in seated 112 degrees, extension neg. 1#  Treatment:   **= HEP  NV= Next visit  np= not performed  nb= non-billable  G= group HEP= discharged to HEP      Therapeutic Exercise:     15 minutes    Nu-step: (subjective taken) Lev 3 x 10'  Stair R knee flexion stretch:  10/10\"  Stair R hams and hip flexor/calf stretch:  3/30\" ea. Cues for form     HS with belt:  10/10\"    Supine IT band stretch:  3/30\"*  Cable TKE pl 2# 15x2   Manual Therapy:     5 minutes  Seated knee distraction, contract relax stretch     Neuromuscular Re-education:    25 minutes    R SLS:  x 5/'5\" max.    Step ups For/Lat:  6\" B UE support:  x 20 ea.  Step downs:  4'' 15x  Steamboats OKC/CKC:  NV   Airex partial squats:  x 20   Airex light UE support marching/alt hip abd/ext:  x 30 ea.   Multi hip 12x each     Modalities:       cold Pack          15 minutes        s    Education:  ex. progression  HEP Progression:  SL multi hip for balance and strength  "

## 2023-11-01 ENCOUNTER — TREATMENT (OUTPATIENT)
Dept: PHYSICAL THERAPY | Facility: CLINIC | Age: 78
End: 2023-11-01
Payer: MEDICARE

## 2023-11-01 DIAGNOSIS — R26.89 DECREASED FUNCTIONAL MOBILITY: ICD-10-CM

## 2023-11-01 DIAGNOSIS — M17.11 ARTHRITIS OF RIGHT KNEE: ICD-10-CM

## 2023-11-01 PROCEDURE — 97112 NEUROMUSCULAR REEDUCATION: CPT | Mod: GP,CQ,KX

## 2023-11-01 PROCEDURE — 97110 THERAPEUTIC EXERCISES: CPT | Mod: GP,CQ

## 2023-11-01 NOTE — PROGRESS NOTES
"  Physical Therapy  Physical Therapy Progress Note    Patient Name Timothy Tam   MRN: [id number]  Today's Date: 11/01/23  Time Calculation  Start Time: 100  Stop Time: 150  Time Calculation (min): 50 min    Insurance:  per information provided by  pre-cert team)  [facility]  Visit #: 8  Certification dates:  10-5-23/1-2-24      Script:  eval and treat 1-2/week for 4-6 weeks/cert for 12 weeks        Therapy Diagnoses:   1. Arthritis of right knee  Follow Up In Physical Therapy      2. Decreased functional mobility  Follow Up In Physical Therapy          General:  Reason for visit:  right knee surgery   Referred by: Dr. Tex Killian MD appt:   6weeks from 10/26/23  Preferred Name:  timothy  Script:  :  eval and treat 1-2/week for 4-6 weeks/cert for 12 weeks       Onset Date:  9/19/23    Assessment:  Patient tolerated treatment well, did well with progression this date  with increase step downs  Needs continued work on strength and knee stability/skilled PT for remaining functional, objective and subjective deficits to allow them to return to prior /optimal level of function with ADLs./  Patient is progressing with function/goals: increase knee flexion and function on stairs  Skilled care:  cueing with posture with sit to stand and foot placement    Plan:    Continue with poc as documented in the legacy system.     Continue to progress per poc:   NV add  leg press    Subjective:   Patient reports: states she does well in the AM but by 5:00 she needs a pain pill  .    Have you fallen since last visit:  no        Precautions: no fall risk  R TKR 2007, revision 9-19-23, L TKR 2009, R RTC repair and L shoulder labral tear, treated with injections,  A-fib, depression, high cholesterol, see meds in chart.    Pain:  3/10  Location/Type of pain:  right knee, \"ache\"    HEP compliance/understanding:  yes    Objective:   Objective Measurements:    Function:   sleeping better now. Doing step up and down  Gait : taking longer " "strides   ,   ROM:  knee flexion in seated 115 degrees, extension neg. 1#  Treatment:   **= HEP  NV= Next visit  np= not performed  nb= non-billable  G= group HEP= discharged to HEP      Therapeutic Exercise:     15 minutes    Nu-step: (subjective taken) Lev 3 x 10'  Stair R knee flexion stretch:  10/10\"  Stair R hams and hip flexor/calf stretch:  3/30\" ea. Cues for form     HS with belt:  10/10\"    Supine IT band stretch:  3/30\"*  Cable TKE pl 2# 15x2   Leg extension both only 10#   Ham curl 20# 10x2      Neuromuscular Re-education:    25 minutes    R SLS:  x 5/'5\" max.    Step ups For/Lat:  6\" B UE support:  x 20 ea.  Step downs:  6'' 15x  Steamboats OKC/CKC:  NV   Airex partial squats:  x 20   Airex light UE support marching/alt hip abd/ext:  x 30 ea.   Multi hip 12x each   Sit to stand 10x  Modalities:       cold Pack To left knee          10 minutes        s    Education:  progression of POC  HEP Progression:  step down 6''tep  "

## 2023-11-02 ENCOUNTER — OFFICE VISIT (OUTPATIENT)
Dept: ORTHOPEDIC SURGERY | Facility: CLINIC | Age: 78
End: 2023-11-02
Payer: MEDICARE

## 2023-11-02 ENCOUNTER — ANCILLARY PROCEDURE (OUTPATIENT)
Dept: RADIOLOGY | Facility: CLINIC | Age: 78
End: 2023-11-02
Payer: MEDICARE

## 2023-11-02 VITALS — HEIGHT: 66 IN | BODY MASS INDEX: 31.82 KG/M2 | WEIGHT: 198 LBS

## 2023-11-02 DIAGNOSIS — M79.641 RIGHT HAND PAIN: Primary | ICD-10-CM

## 2023-11-02 DIAGNOSIS — M79.641 RIGHT HAND PAIN: ICD-10-CM

## 2023-11-02 PROCEDURE — 1036F TOBACCO NON-USER: CPT | Performed by: ORTHOPAEDIC SURGERY

## 2023-11-02 PROCEDURE — 1160F RVW MEDS BY RX/DR IN RCRD: CPT | Performed by: ORTHOPAEDIC SURGERY

## 2023-11-02 PROCEDURE — 99213 OFFICE O/P EST LOW 20 MIN: CPT | Performed by: ORTHOPAEDIC SURGERY

## 2023-11-02 PROCEDURE — 3078F DIAST BP <80 MM HG: CPT | Performed by: ORTHOPAEDIC SURGERY

## 2023-11-02 PROCEDURE — 1125F AMNT PAIN NOTED PAIN PRSNT: CPT | Performed by: ORTHOPAEDIC SURGERY

## 2023-11-02 PROCEDURE — 1159F MED LIST DOCD IN RCRD: CPT | Performed by: ORTHOPAEDIC SURGERY

## 2023-11-02 PROCEDURE — 73130 X-RAY EXAM OF HAND: CPT | Mod: RT,FY

## 2023-11-02 PROCEDURE — 3074F SYST BP LT 130 MM HG: CPT | Performed by: ORTHOPAEDIC SURGERY

## 2023-11-02 PROCEDURE — 73130 X-RAY EXAM OF HAND: CPT | Mod: RIGHT SIDE | Performed by: RADIOLOGY

## 2023-11-02 NOTE — PROGRESS NOTES
Subjective    Patient ID: Regina aTm is a 77 y.o. female.    Chief Complaint: Pain of the Right Hand     Last Surgery: No surgery found  Last Surgery Date: No surgery found    HPI  Patient is a 77-year-old right-hand-dominant female who comes in with a complaint of pain and swelling at her right second and third MCP joint.  This has been present for at least 1 month.  She currently denies any trauma to her right hand.  However she has been using a walker at times as she recovered from a revision right total knee replacement.  She denies any fevers or chills.    Objective   Ortho Exam  Patient is in no acute distress.  Exam of her right hand and wrist reveals her skin envelope is intact.  She has swelling but no erythema over the second and third MCP joints dorsally.  She has adequate range of motion in her fingers.  She has no significant tenderness over the second and third MCP joints.    Image Results:  X-rays of her right hand were personally reviewed today.  They showed mild arthritic changes at the second and third MCP joints.  There is also moderate nearly bone-on-bone arthritic changes at the first CMC joint.    Assessment/Plan   Encounter Diagnoses:  Right hand pain    Patient has right hand pain secondary to arthritic changes at her second and third MCP joints.  I would recommend the use of over-the-counter analgesics or even anti-inflammatory gel.  The patient will follow-up as her symptoms dictate.

## 2023-11-03 ENCOUNTER — APPOINTMENT (OUTPATIENT)
Dept: PHYSICAL THERAPY | Facility: CLINIC | Age: 78
End: 2023-11-03
Payer: MEDICARE

## 2023-11-06 ENCOUNTER — ANTICOAGULATION - WARFARIN VISIT (OUTPATIENT)
Dept: CARDIOLOGY | Facility: CLINIC | Age: 78
End: 2023-11-06
Payer: MEDICARE

## 2023-11-06 DIAGNOSIS — I48.91 ATRIAL FIBRILLATION, UNSPECIFIED TYPE (MULTI): ICD-10-CM

## 2023-11-06 DIAGNOSIS — I48.91 ATRIAL FIBRILLATION, UNSPECIFIED TYPE (MULTI): Primary | ICD-10-CM

## 2023-11-06 LAB
POC INR: 1.9
POC PROTHROMBIN TIME: NORMAL

## 2023-11-06 PROCEDURE — 99211 OFF/OP EST MAY X REQ PHY/QHP: CPT | Mod: PO | Performed by: FAMILY MEDICINE

## 2023-11-06 PROCEDURE — 85610 PROTHROMBIN TIME: CPT | Mod: QW

## 2023-11-06 NOTE — PROGRESS NOTES
Patient identification verified with 2 identifiers.    Location: [facility] - [address] [place] [address] [phone]     Referring Physician: Ramila Quiroga  Enrollment/ Re-enrollment date: 11/16/23   INR Goal: 2.0-3.0  INR monitoring is per [facility] protocol.  Anticoagulation Medication: warfarin  Indication: atrial fibrillation    Subjective   Bleeding signs/symptoms: No    Bruising: No   Major bleeding event: No  Thrombosis signs/symptoms: No  Thromboembolic event: No  Missed doses: No  Extra doses: No  Medication changes: No  Dietary changes: No  Change in health: No  Change in activity: No  Alcohol: No  Other concerns: No    Upcoming Surgeries:  Does the Patient Have any upcoming surgeries that require interruption in anticoagulation therapy? no  Does the patient require bridging? no      Anticoagulation Summary  As of 11/6/2023      INR goal:  2.0-3.0   TTR:  26.0 % (2.6 wk)   INR used for dosing:                 Assessment/Plan   Subtherapeutic    1. New dose: Patient stated that she was on an antibiotic which she finished 2 days ago.  Patient cannot remember the name of the medication.  Will maintain dose and patient will return 1 week.  RENEWAL APPLICATION SENT TO RAMILA QUIROGA MD ON TODAY(11/6/23)    2. Next INR: 1 week      Education provided to patient during the visit:  Patient instructed to call in interim with questions, concerns and changes.

## 2023-11-07 ENCOUNTER — TREATMENT (OUTPATIENT)
Dept: PHYSICAL THERAPY | Facility: CLINIC | Age: 78
End: 2023-11-07
Payer: MEDICARE

## 2023-11-07 DIAGNOSIS — M17.11 ARTHRITIS OF RIGHT KNEE: Primary | ICD-10-CM

## 2023-11-07 DIAGNOSIS — R26.89 DECREASED FUNCTIONAL MOBILITY: ICD-10-CM

## 2023-11-07 PROCEDURE — 97112 NEUROMUSCULAR REEDUCATION: CPT | Mod: GP,CQ,KX

## 2023-11-07 PROCEDURE — 97110 THERAPEUTIC EXERCISES: CPT | Mod: GP,CQ,KX

## 2023-11-07 NOTE — PROGRESS NOTES
"  Physical Therapy  Physical Therapy Progress Note    Patient Name Timothy Tam   MRN: [id number]  Today's Date: 11/08/23  Time Calculation  Start Time: 0100  Stop Time: 0150  Time Calculation (min): 50 min    :    Insurance:    per information provided by  pre-cert team)  [facility]  Visit #: 9  Certification dates:  10-5-23/1-2-24      Script:  eval and treat 1-2/week for 4-6 weeks/cert for 12 weeks    Therapy Diagnoses:   1. Arthritis of right knee  Follow Up In Physical Therapy      2. Decreased functional mobility  Follow Up In Physical Therapy          General:  Reason for visit:  right knee surgery   Referred by: Dr. Tex Killian MD appt:   6weeks from 10/26/23  Preferred Name:  timothy  Script:  :  eval and treat 1-2/week for 4-6 weeks/cert for 12 weeks       Onset Date:  9/19/23  Assessment:  Patient tolerated treatment well,  with no medication before therapy, did well with progression this date. With added leg press and balance  activity on airex  Needs continued work on/skilled PT for remaining functional, objective and subjective deficits to allow them to return to prior /optimal level of function with ADLs./to be able to perform  longer walks with less fatigue  patient is progressing with function/goals: in strength and stability at the knee  Skilled care:  in progressing balance strength and stability to the knee and core    Plan:    Continue with poc as documented in the legacy system.     Continue to progress per poc:   NV add hip machine    Subjective:   Patient reports:  today first time doing therapy without medication. 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     Pain:  2/10  Location/Type of pain:  right knee, \"ache\"     HEP " "compliance/understanding:  yes     Objective:   Objective Measurements:     Function :going up and down her basement steps without difficulty  Balance: progressing in balance on airex and minimal hand support  ROM:  knee flexion in seated 116 degrees, supine 110      Treatment:   **= HEP  NV= Next visit  np= not performed  nb= non-billable  G= group HEP= discharged to HEP      Therapeutic Exercise:     10 minutes    Nu-step: (subjective taken) Lev 3 x 10'  Stair R knee flexion stretch:  10/10\"  Stair R hams and hip flexor/calf stretch:  3/30\" ea. Cues for form     HS with belt:  10/10\"    Supine IT band stretch:  3/30\"*  Cable TKE pl 2# 15x2 np  Leg extension both only 10# np  Ham curl 20# 10x2 np  Leg press 10# 15x2        Neuromuscular Re-education:    30 minutes       R SLS:  x 5/'5\" max.   np  Step ups For/Lat:  6\" plus airex 15x.  Step downs:  6'' 15x  Steamboats OKC/CKC:  NV   Airex partial squats:  x 20   Airex light UE support marching/alt hip abd/ext:  x 30 ea.   Airex calf raises 20x   Airex Multi hip 12x each   Sit to stand 10x off 16'' box and airex no hands  Side walks yellow 3 sets         Modalities:                     Cp- 10 min to rt knee    Education:  instructed in correct ex. Performance on equipment  HEP Progression:  side walks  "

## 2023-11-09 ENCOUNTER — TREATMENT (OUTPATIENT)
Dept: PHYSICAL THERAPY | Facility: CLINIC | Age: 78
End: 2023-11-09
Payer: MEDICARE

## 2023-11-09 DIAGNOSIS — M17.11 ARTHRITIS OF RIGHT KNEE: Primary | ICD-10-CM

## 2023-11-09 DIAGNOSIS — R26.89 DECREASED FUNCTIONAL MOBILITY: ICD-10-CM

## 2023-11-09 PROCEDURE — 97110 THERAPEUTIC EXERCISES: CPT | Mod: GP,CQ,KX

## 2023-11-09 PROCEDURE — 97112 NEUROMUSCULAR REEDUCATION: CPT | Mod: GP,CQ,KX

## 2023-11-09 NOTE — PROGRESS NOTES
"  Physical Therapy  Physical Therapy Progress Note    Patient Name Timothy Tam   MRN: [id number]  Today's Date: 11/09/23  Time Calculation  Start Time: 0100  Stop Time: 0145  Time Calculation (min): 45 min    Insurance:     per information provided by  pre-cert team)  [facility]  Visit #: 10  Certification dates:  10-5-23/1-2-24      Script:  eval and treat 1-2/week for 4-6 weeks/cert for 12 weeks   Therapy Diagnoses:   1. Arthritis of right knee  Follow Up In Physical Therapy      2. Decreased functional mobility  Follow Up In Physical Therapy          General:  Reason for visit:  right knee surgery   Referred by: Dr. Tex Killian MD appt:   6weeks from 11/9/2023  Preferred Name:  timothy  Script:  :  eval and treat 1-2/week for 4-6 weeks/cert for 12 weeks       Onset Date:  9/19/23  Assessment:  Patient tolerated treatment: well  Patient needs continued work on/ strength and endurance skilled PT for: progression in SL balance. And less hand support with step ups. Patient  needs to address remaining functional, objective and subjective deficits to allow them to return to prior /optimal level of function with ADLs.  Patient is progressing with goals: with knowledge of equipment to work on her own  Skilled care:  instructed in set up of equipment with hip abd/add    Plan:    Continue with poc as documented in the legacy system.     Continue to progress per poc:   NV add rocker board    Subjective:   Patient reports:  states no pain just aches at times. She had seen the Dr and was pleased with progression,   She plans to address the shoulder pain after  the New Year    Have you fallen since last visit:  no    Precautions:     Precautions: no fall risk  R TKR 2007, revision 9-19-23, L TKR 2009, R RTC repair and L shoulder labral tear, treated with injections,  A-fib, depression, high cholesterol, see meds in chart.     Pain:  1/10  Location/Type of pain:  right knee, \"ache\"     HEP compliance/understanding:  " "yes      Objective:   Objective Measurements:    Function:   step ups with light hand support, sit to stand with no hand  Balance: tandem stance on airex with minimal hand support  ROM:  lacking minimal full knee extension to be equal with left        Treatment:   **= HEP  NV= Next visit  np= not performed  nb= non-billable  G= group HEP= discharged to HEP      Therapeutic Exercise:     20 minutes  Nu-step: (subjective taken) Lev 3 x 10'  Stair R knee flexion stretch:  10/10\"  Stair R hams and hip flexor/calf stretch:  3/30\" ea. Cues for form     HS with belt:  10/10\"    Supine IT band stretch:  3/30\"*  Cable TKE pl 2# 15x2 np  Leg extension both only 10#   Ham curl 20# 10x2   Leg press 10# 15x2  Hip abd/add 20# 10x2       Neuromuscular Re-education:    25 minutes    R SLS:  x 5/'5\" max.   np  Step ups For/Lat:  6\" plus airex 15x.  Step downs:  6'' 15x  Steamboats OKC/CKC:  NV   Airex partial squats:  x 20   Airex light UE support marching/alt hip abd/ext:  x 30 ea.   Airex calf raises 20x   Airex Multi hip 12x each   Airex tandem stance 30sec each   Sit to stand 10x off 16'' box and airex no hands  Side walks yellow 3 sets hep                               :                 Education:  instruction in added equipment  HEP Progression:  progressed with gym program to follow  "

## 2023-11-10 ENCOUNTER — ANCILLARY PROCEDURE (OUTPATIENT)
Dept: RADIOLOGY | Facility: CLINIC | Age: 78
End: 2023-11-10
Payer: MEDICARE

## 2023-11-10 DIAGNOSIS — M25.511 RIGHT SHOULDER PAIN: ICD-10-CM

## 2023-11-10 PROCEDURE — 73030 X-RAY EXAM OF SHOULDER: CPT | Mod: RT

## 2023-11-10 PROCEDURE — 73030 X-RAY EXAM OF SHOULDER: CPT | Mod: RIGHT SIDE | Performed by: RADIOLOGY

## 2023-11-14 ENCOUNTER — TREATMENT (OUTPATIENT)
Dept: PHYSICAL THERAPY | Facility: CLINIC | Age: 78
End: 2023-11-14
Payer: MEDICARE

## 2023-11-14 DIAGNOSIS — M17.11 ARTHRITIS OF RIGHT KNEE: ICD-10-CM

## 2023-11-14 DIAGNOSIS — R26.89 DECREASED FUNCTIONAL MOBILITY: ICD-10-CM

## 2023-11-14 PROCEDURE — 97112 NEUROMUSCULAR REEDUCATION: CPT | Mod: GP,CQ,KX

## 2023-11-14 PROCEDURE — 97110 THERAPEUTIC EXERCISES: CPT | Mod: GP,CQ,KX

## 2023-11-14 NOTE — PROGRESS NOTES
Pt pr  Physical Therapy  Physical Therapy Progress Note    Patient Name Timothy Tam   MRN: [id number]  Today's Date: 11/14/23  Time Calculation  Start Time: 0100  Stop Time: 0155  Time Calculation (min): 55 min    Insurance:    per information provided by  pre-cert team)  [facility]  Visit #: 11  Certification dates:  10-5-23/1-2-24      Script:  eval and treat 1-2/week for 4-6 weeks/cert for 12 weeks   Therapy Diagnoses:   1. Arthritis of right knee  Follow Up In Physical Therapy      2. Decreased functional mobility  Follow Up In Physical Therapy          General:  Reason for visit:  right knee surgery   Referred by: Dr. Tex Killian MD appt:   6weeks from 11/9/2023  Preferred Name:  timothy  Script:  :  eval and treat 1-2/week for 4-6 weeks/cert for 12 weeks       Onset Date:  9/19/23  Assessment:  Patient tolerated treatment:,well, demonstrates improved function with step ups to 8'' step. Patient has  No further problem with her infection.  Patient needs continued work on/ ROM and knee stability skilled PT for: SL balance to address remaining functional, objective and subjective deficits to allow them to return to prior /optimal level of function with ADLs.  Patient is progressing with goals: increase free wts for strength  Skilled care:  cues for ex performance    Plan:    Continue with poc as documented in the legacy system.     Continue to progress per poc:   NV add increase step downs    Subjective:   Patient reports:  states  no pain in the knee, by the end of the day it gets slight discomfort ,ice helps    Have you fallen since last visit:  no    Precautions:   Precautions: no fall risk  R TKR 2007, revision 9-19-23, L TKR 2009, R RTC repair and L shoulder labral tear, treated with injections,  A-fib, depression, high cholesterol, see meds in chart.     Pain:  0/10  Location/Type of pain:  right knee     HEP compliance/understanding:  yes   Objective Measurements:    Function:   no problem with walking  "longer distance in  stores . Increased step up 8'' . Able to get off the commode with out hands support   :  Balance:  sl with finger touch  ROM:  knee flexion  110 degrees       Treatment:   **= HEP  NV= Next visit  np= not performed  nb= non-billable  G= group HEP= discharged to HEP      Therapeutic Exercise:     20 minutes    es  Nu-step: (subjective taken) Lev 3 x 10'  Stair R knee flexion stretch:  10/10\"  Stair R hams and hip flexor/calf stretch:  3/30\" ea. Cues for form     HS with belt:  10/10\"    Supine IT band stretch:  3/30\"*  Cable TKE pl 2# 15x2 np  Leg extension both only 10# 15x2  Ham curl 20# 15x2   Leg press 10# 15x2  Hip abd/add 20# 15x2     Neuromuscular Re-education:    25 minutes     R SLS:  x 5/'5\" max.   np  Step ups For/Lat:  8\" plus airex 15x.  Step downs:  6'' 15x  Steamboats OKC/CKC:  NV   Airex partial squats:  x 20   Airex light UE support marching/alt hip abd/ext:  x 30 ea.   np  Airex calf raises 20x   np  Airex Multi hip 12x each   Airex tandem stance 30sec each   np  Sit to stand 10x off 16'' box and airex no hands  Side walks yellow 3 sets hep                               Cp 10 min seated to rt knee and sh.  Education:  progression   HEP Progression:  review of HEP  "

## 2023-11-16 ENCOUNTER — OFFICE VISIT (OUTPATIENT)
Dept: ORTHOPEDIC SURGERY | Facility: CLINIC | Age: 78
End: 2023-11-16
Payer: MEDICARE

## 2023-11-16 ENCOUNTER — TREATMENT (OUTPATIENT)
Dept: PHYSICAL THERAPY | Facility: CLINIC | Age: 78
End: 2023-11-16
Payer: MEDICARE

## 2023-11-16 VITALS — BODY MASS INDEX: 31.18 KG/M2 | WEIGHT: 194 LBS | HEIGHT: 66 IN

## 2023-11-16 DIAGNOSIS — M75.81 ROTATOR CUFF TENDONITIS, RIGHT: Primary | ICD-10-CM

## 2023-11-16 DIAGNOSIS — R26.89 DECREASED FUNCTIONAL MOBILITY: ICD-10-CM

## 2023-11-16 DIAGNOSIS — M25.511 RIGHT SHOULDER PAIN: ICD-10-CM

## 2023-11-16 DIAGNOSIS — M17.11 ARTHRITIS OF RIGHT KNEE: ICD-10-CM

## 2023-11-16 PROCEDURE — 1160F RVW MEDS BY RX/DR IN RCRD: CPT | Performed by: ORTHOPAEDIC SURGERY

## 2023-11-16 PROCEDURE — 1125F AMNT PAIN NOTED PAIN PRSNT: CPT | Performed by: ORTHOPAEDIC SURGERY

## 2023-11-16 PROCEDURE — 1159F MED LIST DOCD IN RCRD: CPT | Performed by: ORTHOPAEDIC SURGERY

## 2023-11-16 PROCEDURE — 20610 DRAIN/INJ JOINT/BURSA W/O US: CPT | Performed by: ORTHOPAEDIC SURGERY

## 2023-11-16 PROCEDURE — 97110 THERAPEUTIC EXERCISES: CPT | Mod: GP,KX | Performed by: PHYSICAL THERAPIST

## 2023-11-16 PROCEDURE — 99214 OFFICE O/P EST MOD 30 MIN: CPT | Performed by: ORTHOPAEDIC SURGERY

## 2023-11-16 PROCEDURE — 1036F TOBACCO NON-USER: CPT | Performed by: ORTHOPAEDIC SURGERY

## 2023-11-16 RX ORDER — LIDOCAINE HYDROCHLORIDE 10 MG/ML
2 INJECTION INFILTRATION; PERINEURAL
Status: COMPLETED | OUTPATIENT
Start: 2023-11-16 | End: 2023-11-16

## 2023-11-16 RX ORDER — TRIAMCINOLONE ACETONIDE 40 MG/ML
40 INJECTION, SUSPENSION INTRA-ARTICULAR; INTRAMUSCULAR
Status: COMPLETED | OUTPATIENT
Start: 2023-11-16 | End: 2023-11-16

## 2023-11-16 RX ADMIN — TRIAMCINOLONE ACETONIDE 40 MG: 40 INJECTION, SUSPENSION INTRA-ARTICULAR; INTRAMUSCULAR at 12:12

## 2023-11-16 RX ADMIN — LIDOCAINE HYDROCHLORIDE 2 ML: 10 INJECTION INFILTRATION; PERINEURAL at 12:12

## 2023-11-16 NOTE — PROGRESS NOTES
"Physical Therapy  Physical Therapy Progress Note/re-assessment/discharge summary    Patient Name Timothy Tam   MRN: [id number]  Today's Date: 11/16/23  Time Calculation  Start Time: 0145  Stop Time: 0215  Time Calculation (min): 30 min    Insurance:    per information provided by  pre-cert team)  [facility]  Visit #: 12  Certification dates:  10-5-23/1-2-24     Therapy Diagnoses:   1. Arthritis of right knee  Follow Up In Physical Therapy      2. Decreased functional mobility  Follow Up In Physical Therapy        General:  Reason for visit:  right knee surgery   Referred by: Dr. Tex Killian MD appt:   6weeks from 11/9/2023  Preferred Name:  timothy  Script:  :  eval and treat 1-2/week for 4-6 weeks/cert for 12 weeks       Onset Date:  9/19/23  Assessment:  STG:  In two weeks.   Increased postural awareness. Met     Compliant with HEP Met     LTG: by discharge     Increased postural awareness and posture WFL. Met     Increased function with ADL's and IADL's.  Improve LEFS to:  20 %  limitation of function. Met     Normal gait pattern  on level and uneven surfaces community level distances for improved function in the community.  Met     Normal reciprocal pattern on stairs for improved function to all levels of home.   Met     Increase  R SLS to 15\" Partially Met     Increase R knee AROM to 0/120 for improved function with car transfers.  Partially Met     Increase R L strength to WNL for improved function with chores. Met     Increase R LE flexibility to WFL.   Met     I and compliant with HEP and proper: R LE care.   Met  Skilled care:  re-assessment    Plan:    Discharge from PT,continue with HEP and [employer] gym program.  Patient to schedule PT evaluation for the R shoulder.      Subjective:   Patient reports:  that she is feeling good and the R knee is feeling good.  She will be back for PT for the R shoulder.  Patient got a cortisone shot in the R shoulder earlier today.      Have you fallen since last visit:  " "no    Precautions: no fall risk  R TKR 2007, revision 9-19-23, L TKR 2009, R RTC repair and L shoulder labral tear, treated with injections,  A-fib, depression, high cholesterol, see meds in chart.     Pain:  0/10  Location/Type of pain:  min tenderness and swelling    HEP compliance/understanding:  yes    Objective:   Objective Measurements:    Sleep:  WFL, R shoulder interferes with sleep vs. The R knee(was difficulty with sleeping, prefers side lying,  instructed in proper postures.)  ADL's:  WNL in regards to knee, no longer using shower chair or RTS (was No problem with showers with shower chair and has a RTS.  Has WW and straight cane, grab bars in shower.  )  Chores:  WNL in regards to knee (was doing light cooking)  Driving:  WNL in regards to knee (was not driving, doing well with transfers.  )  Work:  retired  Recreation: Has been instructed in [employer] equipment (was wants to resume going to the [employer], Nu-step, machines.  )      Posture:  min decreased WB R LE.  11-16-23:  Symmetrical WB.       Gait/stairs:  ambulates with ww with min forward flexed posture, able to ambulate indoors without device with slow but good pattern, stairs with step to pattern with L LE WB with rails.  11-16-23:  Normal gait on level and uneven surfaces community level distances without device and up and down stairs with reciprocal pattern with one rail with good pattern.        Balance:   R SLS:  10\"(was 8\")     ROM:  L knee ext/flex:  A: 0/AA:  97     MMT:   Hip flexors: 4+/5(was 4-/5)  ext: 4-/5 (No change)  abd: 4-/5  add: 4-/5  Quads: 4+/5(was 18 Quad lag)  Hams: 3-/5  DF: 5/5(was 4+/5)  PF: 5/5(was 4+/5)(NWB)     Flexibility:    Hams:  90/90: -12(was -22)  Heelcords: 3  Hip flexors:  mod      Palpation:  Ecchymosis noted around R distal LE and knee and steri-strips present over incision, incision healing well.      Outcome Measures:  Eval:  Other Measures  Lower Extremity Funtional Score (LEFS): 44 (44/64:  31% limitation of function) "     Other Measures  Lower Extremity Funtional Score (LEFS): 59 (59/64:8% limitation of function.)      Treatment:   **= HEP  NV= Next visit  np= not performed  nb= non-billable  G= group HEP= discharged to HEP  Therapeutic Exercise:     30 minutes  Re-assessment    Education:  discharge plan

## 2023-11-16 NOTE — PROGRESS NOTES
77-year-old is seen with a new problem of right shoulder pain.  She has had a severe sharp shooting pain in the right shoulder over the last 6 weeks.  She has had difficulty with overhead reaching and lifting activities.  She had a revision knee surgery in September and she has been using the arm to push herself up from a seated position and for mobility more often and this is aggravated the pain.  She has a history of a right shoulder rotator cuff repair in [place] in 2016.  She is applied ice and heat and Tylenol and use topical ointments.  She has difficulty sleeping.    Pleasant and no acute distress.  Right shoulder forward flexion 160°. No effusion or instability. There is positive Neer and Hess impingement. There is subacromial crepitus and tenderness around the subacromial space. No biceps tenderness. No acromioclavicular tenderness. Rotator cuff strength is decreased but there is discomfort with strength testing. Left shoulder forward flexion 180°. No effusion or instability. No impingement or crepitus or tenderness involving the subacromial space. No biceps or acromioclavicular tenderness. There is intact rotator cuff strength. There is adequate range of motion of the cervical spine without pain. Both upper extremities are well perfused, skin is intact and muscle tone is adequate. Elbow flexion and extension and wrist flexion and extension strength are intact.    Multiple x-ray views of the right shoulder are personally reviewed and there is a small inferior humeral head osteophyte.    Discussion about her shoulder was done.  Treatment options were reviewed.  Decision was made proceed with cortisone injection.  She will avoid aggravating activities.  She can ice or heat and use Tylenol.  Follow-up based on her symptoms.  If she had persistent pain then MRI could be considered.    L Inj/Asp: R subacromial bursa on 11/16/2023 12:12 PM  Indications: pain  Details: 22 G needle, posterior  approach  Medications: 40 mg triamcinolone acetonide 40 mg/mL; 2 mL lidocaine 10 mg/mL (1 %)  Procedure, treatment alternatives, risks and benefits explained, specific risks discussed. Consent was given by the parent.

## 2023-11-17 ENCOUNTER — ANTICOAGULATION - WARFARIN VISIT (OUTPATIENT)
Dept: CARDIOLOGY | Facility: CLINIC | Age: 78
End: 2023-11-17
Payer: MEDICARE

## 2023-11-17 DIAGNOSIS — I48.91 ATRIAL FIBRILLATION, UNSPECIFIED TYPE (MULTI): Primary | ICD-10-CM

## 2023-11-17 LAB
POC INR: 2.1
POC PROTHROMBIN TIME: NORMAL

## 2023-11-17 PROCEDURE — 99211 OFF/OP EST MAY X REQ PHY/QHP: CPT | Performed by: FAMILY MEDICINE

## 2023-11-17 PROCEDURE — 85610 PROTHROMBIN TIME: CPT | Mod: QW

## 2023-11-17 NOTE — PROGRESS NOTES
Patient identification verified with 2 identifiers.    Location: [facility] - [address] [place] [address] [phone]     Referring Physician: Ramila Quiroga  Enrollment/ Re-enrollment date: 23   INR Goal: 2.0-3.0  INR monitoring is per [facility] protocol.  Anticoagulation Medication: warfarin  Indication: atrial fibrillation    Subjective   Bleeding signs/symptoms: No    Bruising: No   Major bleeding event: No  Thrombosis signs/symptoms: No  Thromboembolic event: No  Missed doses: No  Extra doses: No  Medication changes: No  Dietary changes: No  Change in health: No  Change in activity: No  Alcohol: No  Other concerns: No    Upcoming Surgeries:  Does the Patient Have any upcoming surgeries that require interruption in anticoagulation therapy? no  Does the patient require bridging? no      Anticoagulation Summary  As of 2023      INR goal:  2.0-3.0   TTR:  54.7 % (1.9 mo)   INR used for dosin.10 (2023)   Weekly warfarin total:  37.5 mg               Assessment/Plan   Therapeutic     1. New dose: no change.   INR therapeutic.  Will maintain dose.  Patient will return in 4 weeks.  RENEWAL APPLICATION SENT TO RAMILA QUIROGA MD ON  AND AGAIN ON 23  2. Next INR: 1 month      Education provided to patient during the visit:  Patient instructed to call in interim with questions, concerns and changes.

## 2023-11-29 DIAGNOSIS — I10 PRIMARY HYPERTENSION: Primary | ICD-10-CM

## 2023-11-29 DIAGNOSIS — K21.9 GASTROESOPHAGEAL REFLUX DISEASE WITHOUT ESOPHAGITIS: ICD-10-CM

## 2023-11-29 DIAGNOSIS — F32.1 DEPRESSION, MAJOR, SINGLE EPISODE, MODERATE (MULTI): ICD-10-CM

## 2023-11-29 RX ORDER — FLUOXETINE HYDROCHLORIDE 40 MG/1
40 CAPSULE ORAL 2 TIMES DAILY
Qty: 180 CAPSULE | Refills: 1 | Status: SHIPPED | OUTPATIENT
Start: 2023-11-29

## 2023-11-29 RX ORDER — RAMIPRIL 5 MG/1
5 CAPSULE ORAL DAILY
Qty: 90 CAPSULE | Refills: 0 | Status: SHIPPED | OUTPATIENT
Start: 2023-11-29 | End: 2024-03-01

## 2023-11-29 RX ORDER — ESOMEPRAZOLE MAGNESIUM 40 MG/1
40 CAPSULE, DELAYED RELEASE ORAL
Qty: 90 CAPSULE | Refills: 3 | Status: SHIPPED | OUTPATIENT
Start: 2023-11-29

## 2023-11-30 ENCOUNTER — EVALUATION (OUTPATIENT)
Dept: PHYSICAL THERAPY | Facility: CLINIC | Age: 78
End: 2023-11-30
Payer: MEDICARE

## 2023-11-30 DIAGNOSIS — M75.81 ROTATOR CUFF TENDONITIS, RIGHT: ICD-10-CM

## 2023-11-30 DIAGNOSIS — M75.81 TENDINITIS OF RIGHT ROTATOR CUFF: Primary | ICD-10-CM

## 2023-11-30 PROCEDURE — 97161 PT EVAL LOW COMPLEX 20 MIN: CPT | Mod: GP,KX | Performed by: PHYSICAL THERAPIST

## 2023-11-30 PROCEDURE — 97110 THERAPEUTIC EXERCISES: CPT | Mod: GP | Performed by: PHYSICAL THERAPIST

## 2023-11-30 ASSESSMENT — PATIENT HEALTH QUESTIONNAIRE - PHQ9
SUM OF ALL RESPONSES TO PHQ9 QUESTIONS 1 AND 2: 0
2. FEELING DOWN, DEPRESSED OR HOPELESS: NOT AT ALL
1. LITTLE INTEREST OR PLEASURE IN DOING THINGS: NOT AT ALL

## 2023-11-30 ASSESSMENT — ENCOUNTER SYMPTOMS
LOSS OF SENSATION IN FEET: 0
OCCASIONAL FEELINGS OF UNSTEADINESS: 0
DEPRESSION: 1

## 2023-11-30 NOTE — LETTER
November 30, 2023    [place]  [address] [facility]  [facility] [address]    Patient: Regina Tam   YOB: 1945   Date of Visit: 11/30/2023       Dear Cole Tracy Md  [address] [facility]  [place] [address]    The attached plan of care is being sent to you because your patient’s medical reimbursement requires that you certify the plan of care. Your signature is required to allow uninterrupted insurance coverage.      You may indicate your approval by signing below and faxing this form back to us at Dept Fax: [phone].    Please call Dept: [phone] with any questions or concerns.    Thank you for this referral,        Adriana Solitario, PT  PAR [address] [facility]  [address] [facility] [address]    Payer: Payor: MEDICARE / Plan: MEDICARE PART A AND B / Product Type: *No Product type* /                                                                         Date:     Dear Adriana Solitario, PT,     Re: Ms. Regina Tam, MRN:[id number]    I certify that I have reviewed the attached plan of care and it is medically necessary for Ms. Regina Tam (1945) who is under my care.          ______________________________________                    _________________  Provider name and credentials                                           Date and time                                                                                           Plan of Care 11/30/23   Effective from: 11/30/2023  Effective to: 2/27/2024    Plan ID: [id number]            Participants as of Finalize on 11/30/2023    Name Type Comments Contact Info    oCle Tracy MD Referring Provider  [phone]    Adriana Solitario PT Physical Therapist  [phone]       Last Plan Note     Author: Adriana Solitario PT Status: Sign when Signing Visit Last edited: 11/30/2023  3:15 PM       Patient Name Regina CASTRO  Yonatan  MRN: [id number]  Today's Date: 23       Insurance:   Visit #: 1/KX  Insurance Reviewed  MCR  (per information provided by  pre-cert team)   Authorization required:  N  [facility] certification date range:  23/24    Therapy Diagnoses:   1. Tendinitis of right rotator cuff        2. Rotator cuff tendonitis, right  Referral to Physical Therapy          General:  Reason for visit: R shoulder tendinitis   Referred by: Cole Killian MD appt:  damián  Preferred Name:  Regina  Script:  PT Eval and treat and HEP  Onset Date:  10-1-23    Assessment:      Evolving with changing characteristics  Level of complexity:  low    Patient with R shoulder muscle and postural imbalance as well as R UE overuse leading to impingement of R RTC tendons, needs work on/Skilled PT for posture, AROM, flexibility, scapular stabilization, soft tissue mobility and strength for improved overall function.       Problems:    Pain:  __x_  Posture/Body mechanics deficits:  __x_  Decreased knowledge HEP:  __x_  Decreased knowledge of Precautions:  ___  Activity Limitations:   _x__  ADL's/IADL's/Self-care skills:  __x_  ROM/Joint Mobility:  _x__  Strength:  _x__  Decreased functional level:   _x__  Flexibility:  _x__  Tenderness/decreased soft tissue mobility:  __x_  Gait/Stairs:  ___  Fall Risk:  ___  Balance:  ___  Edema:  ___  Participation restrictions:  ___  Sensory:  ___  Transfers:  ___  Decreased knowledge of brace:   ___  Other:  ___    X Indicates included in problem list    Goals:    ST weeks  -Increased postural awareness  -Increase R shoulder AROM 20 degrees throughout.  -Compliant with HEP  LTG:  by discharge  -Decrease R tamela pain to: 2/10 at worst and patient I with self management of symptoms.   -Increased function with ADL's and IADL's and decrease Quick Dash to  10% limitation of function.    -Posture WFL  -Increase R tamela AROM to WFL for improved function with dressing and driving.  -Increase R UE strength and  stability to WFL for improved function with chores.   -Decrease R upper quarter tenderness 50-75% per patient report.  -I and compliant with HEP and proper R tamela care.     Rehab potential to achieve the above goals is good.    Patient is aware of diagnosis and prognosis and agrees with established plan of care and goals.    Plan:   Skilled PT 2 x/week for 12 weeks( Until goals achieved, maximum rehab potential has been achieved, or patient has plateaued)  for:    Aquatics  _____  CP   __x__  Dry needling  ____  Education  _x___  Electrical Stimulation  __x__  Gait training  ____  HEP ___x__  Manual  __x__  Mechanical Traction  ____  NMR  _x___  Self-care/home management  __x__  Therapeutic Exercise   __x__  Therapeutic Activities  __x__  US  __x__  Work Conditioning  ____  Re-assessment  __x__  Other  ____    X Indicates included in treatment plan    PT for Nu-step for functional mobility and soft tissue warm up for more efficient stretching, work on posture, AROM, flexibility, scapular stabilization, soft tissue mobility and strength for improved overall function.   Work on scapular stability next visit with kamlesh and issue for HEP.    Subjective:  HPI: Patient reports that she was using the R UE on the counter of the sink to lift herself off the toilet after TKR at the end of September and developed pain in the R shoulder early in October.  Patient followed up with MD and he x-rayed the shoulder and this was - other than min OA and patient is referred to outpatient PT.      Pain  #:  5  Type of pain:  aching and difficulty lifting R UE overhead/behind body.    What increases pain:  reaching  What decreases pain:  cortisone and stopping painful activity/ice in the past    Medical Hx/    Precautions: Reviewed medical history form with patient and medical screening assessed. 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     Adult Screening Risk:    Fall Risk:   no  Initial Fall Risk Screening:   Patient has not fallen in the last 6 months. Patient does not have a fear of falling. They do not need assistance with sitting, standing or walking. Does not need assistance walking in her home. They do not need assistance in an unfamiliar setting. The patient is not using an assistive device.     Depression/Suicide Screening:   During the past 2 weeks, the patient has not felt down, depressed or hopeless.    During the past 2 weeks, the patient has not felt little interest or pleasure in doing things.    They do not have a risk of suicide.       Human Trafficking risk:  No    Patient goal:  Decreased pain and increased function.   Patient is aware of diagnosis and prognosis.    Living Environment:  single story home and 2 steps  with rail steps into the house . [place] with basement/in-law suite.   Social Support:  lives with:   and adult children     Prior Function:   good function after R RTC repair several years ago and up to onset    Function:    A and O x 3  Sleep: difficult to roll over at night and to pull up covers with R UE, instructed in proper postures.  ADL's:  some difficulty washing and styling hair, compensating for upper body dress.    Chores:  some pain with reaching into cupboards.    Driving:  WFL  Work: retired  Recreation: wants to be active with great grandson    Objective:    Outcome Measures:  Other Measures  Disability of Arm Shoulder Hand (DASH): 31.82 (31.82% limitation of function) -    Posture: mod/severe for head and rounded shoulders and protracted R scap/increased thoracic kyphosis.     ROM  AROM standing:    flexion: 0/104  elevation: 0/92  Apley Scratch:   IR:  R back pocket ER:  R occiput  PROM(supine) WNL throughout    MMT:    Shoulder flexors: 3-/5  extensors: 4-/5  abductors: 3-/5  adductors: 4-/5  IR: 4-/5  ER: 3+/5 pain    Bicep:  4/5  Triceps: 4/5  Supra:  4-/5 pain  Lower traps: NT  Mid traps: NT  Rhomboids: NT  Lats: NT  Serratus  "Anterior: NT    Flexibility:   Pectorals: mod/sev  Upper trapezius: mod  Levator Scapulae: mod    Special tests:    Empty can: + R  [place]: + R  Bicep tendon: - R  Scapulo-humeral rhythm:  mod decreased stability/pain with eccentric lowering.     Palpation: mod tenderness over R supra and subscap tendons and upper traps.      Joint Play: NT    Treatment:    Evaluation:     **= HEP  NV= Next visit  np= not performed  nb= non-billable  G= group HEP= discharged to HEP  Access Code: [email or web address]  Therapeutic Exercise:    Nu-step(subjective taken/education):    NV    L upper trap and lev scap stretches:  3/30\"**  Doorway pec stretches:  NV    Wall walk flex/elev  x 10 ea. **  Cane AAROM:  ext/IR/ER (standing):  x 10/5\" ea. **    NMR:     Scap pinches:  10/5\"    T-band scap depression:  NV  T-band 3 level rows:  NV  T-band lower trap squeeze:  NV  T-band serr ant punch:  NV    Bent over rows:  NV     Education:  poc, anatomy, physiology, posture, body mechanics, safety awareness, HEP.  Preferred learning:  pictures, demonstration.  Demonstrated good understanding.                     Access Code: [email or web address]  URL: [email or web address]  Date: 11/30/2023  Prepared by: Adriana Solitario    Exercises  - Seated Scapular Retraction  - 3-5 x daily - 7 x weekly - 1-2 sets - 10 reps - 5 second hold  - Standing Shoulder Flexion Wall Walk (Mirrored)  - 1 x daily - 7 x weekly - 2-3 sets - 10 reps - 5 second hold  - Standing Shoulder Scaption Wall Walk  - 1 x daily - 7 x weekly - 3 sets - 10 reps  - Standing Shoulder Extension ROM with Dowel  - 1 x daily - 7 x weekly - 3 sets - 10 reps  - Standing Bilateral Shoulder Internal Rotation AAROM with Dowel  - 1 x daily - 7 x weekly - 2-3 sets - 10 reps - 5 second hold  - Standing Shoulder External Rotation AAROM with Dowel (Mirrored)  - 1 x daily - 7 x weekly - 2-3 sets - 10 reps - 5 second hold                           Current Participants as of 11/30/2023    Name Type " Comments Contact Info    Cole Tracy MD Referring Provider  [phone]    Signature pending    Adriana Solitario, CLARE Physical Therapist  [phone]    Signature pending

## 2023-11-30 NOTE — PROGRESS NOTES
Patient Name Regina Tam  MRN: [id number]  Today's Date: 23       Insurance:   Visit #: 1/KX  Insurance Reviewed  MCR  (per information provided by  pre-cert team)   Authorization required:  N  MCR certification date range:  23/24    Therapy Diagnoses:   1. Tendinitis of right rotator cuff        2. Rotator cuff tendonitis, right  Referral to Physical Therapy          General:  Reason for visit: R shoulder tendinitis   Referred by: Cole Killian MD appt:  prn  Preferred Name:  Regina  Script:  PT Eval and treat and HEP  Onset Date:  10-1-23    Assessment:      Evolving with changing characteristics  Level of complexity:  low    Patient with R shoulder muscle and postural imbalance as well as R UE overuse leading to impingement of R RTC tendons, needs work on/Skilled PT for posture, AROM, flexibility, scapular stabilization, soft tissue mobility and strength for improved overall function.       Problems:    Pain:  __x_  Posture/Body mechanics deficits:  __x_  Decreased knowledge HEP:  __x_  Decreased knowledge of Precautions:  ___  Activity Limitations:   _x__  ADL's/IADL's/Self-care skills:  __x_  ROM/Joint Mobility:  _x__  Strength:  _x__  Decreased functional level:   _x__  Flexibility:  _x__  Tenderness/decreased soft tissue mobility:  __x_  Gait/Stairs:  ___  Fall Risk:  ___  Balance:  ___  Edema:  ___  Participation restrictions:  ___  Sensory:  ___  Transfers:  ___  Decreased knowledge of brace:   ___  Other:  ___    X Indicates included in problem list    Goals:    ST weeks  -Increased postural awareness  -Increase R shoulder AROM 20 degrees throughout.  -Compliant with HEP  LTG:  by discharge  -Decrease R tamela pain to: 2/10 at worst and patient I with self management of symptoms.   -Increased function with ADL's and IADL's and decrease Quick Dash to  10% limitation of function.    -Posture WFL  -Increase R tamela AROM to WFL for improved function with dressing and  driving.  -Increase R UE strength and stability to WFL for improved function with chores.   -Decrease R upper quarter tenderness 50-75% per patient report.  -I and compliant with HEP and proper R tamela care.     Rehab potential to achieve the above goals is good.    Patient is aware of diagnosis and prognosis and agrees with established plan of care and goals.    Plan:   Skilled PT 2 x/week for 12 weeks( Until goals achieved, maximum rehab potential has been achieved, or patient has plateaued)  for:    Aquatics  _____  CP   __x__  Dry needling  ____  Education  _x___  Electrical Stimulation  __x__  Gait training  ____  HEP ___x__  Manual  __x__  Mechanical Traction  ____  NMR  _x___  Self-care/home management  __x__  Therapeutic Exercise   __x__  Therapeutic Activities  __x__  US  __x__  Work Conditioning  ____  Re-assessment  __x__  Other  ____    X Indicates included in treatment plan    PT for Nu-step for functional mobility and soft tissue warm up for more efficient stretching, work on posture, AROM, flexibility, scapular stabilization, soft tissue mobility and strength for improved overall function.   Work on scapular stability next visit with kamlesh and issue for HEP.    Subjective:  HPI: Patient reports that she was using the R UE on the counter of the sink to lift herself off the toilet after TKR at the end of September and developed pain in the R shoulder early in October.  Patient followed up with MD and he x-rayed the shoulder and this was - other than min OA and patient is referred to outpatient PT.      Pain  #:  5  Type of pain:  aching and difficulty lifting R UE overhead/behind body.    What increases pain:  reaching  What decreases pain:  cortisone and stopping painful activity/ice in the past    Medical Hx/    Precautions: Reviewed medical history form with patient and medical screening assessed. 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      Adult Screening Risk:    Fall Risk:  no  Initial Fall Risk Screening:   Patient has not fallen in the last 6 months. Patient does not have a fear of falling. They do not need assistance with sitting, standing or walking. Does not need assistance walking in her home. They do not need assistance in an unfamiliar setting. The patient is not using an assistive device.     Depression/Suicide Screening:   During the past 2 weeks, the patient has not felt down, depressed or hopeless.    During the past 2 weeks, the patient has not felt little interest or pleasure in doing things.    They do not have a risk of suicide.       Human Trafficking risk:  No    Patient goal:  Decreased pain and increased function.   Patient is aware of diagnosis and prognosis.    Living Environment:  single story home and 2 steps  with rail steps into the house . [place] with basement/in-law suite.   Social Support:  lives with:   and adult children     Prior Function:   good function after R RTC repair several years ago and up to onset    Function:    A and O x 3  Sleep: difficult to roll over at night and to pull up covers with R UE, instructed in proper postures.  ADL's:  some difficulty washing and styling hair, compensating for upper body dress.    Chores:  some pain with reaching into cupboards.    Driving:  WFL  Work: retired  Recreation: wants to be active with great grandson    Objective:    Outcome Measures:  Other Measures  Disability of Arm Shoulder Hand (DASH): 31.82 (31.82% limitation of function) -    Posture: mod/severe for head and rounded shoulders and protracted R scap/increased thoracic kyphosis.     ROM  AROM standing:    flexion: 0/104  elevation: 0/92  Apley Scratch:   IR:  R back pocket ER:  R occiput  PROM(supine) WNL throughout    MMT:    Shoulder flexors: 3-/5  extensors: 4-/5  abductors: 3-/5  adductors: 4-/5  IR: 4-/5  ER: 3+/5 pain    Bicep:  4/5  Triceps: 4/5  Supra:  4-/5 pain  Lower traps: NT  Mid traps:  "NT  Rhomboids: NT  Lats: NT  Serratus Anterior: NT    Flexibility:   Pectorals: mod/sev  Upper trapezius: mod  Levator Scapulae: mod    Special tests:    Empty can: + R  [place]: + R  Bicep tendon: - R  Scapulo-humeral rhythm:  mod decreased stability/pain with eccentric lowering.     Palpation: mod tenderness over R supra and subscap tendons and upper traps.      Joint Play: NT    Treatment:    Evaluation:     **= HEP  NV= Next visit  np= not performed  nb= non-billable  G= group HEP= discharged to HEP  Access Code: [email or web address]  Therapeutic Exercise:    Nu-step(subjective taken/education):    NV    L upper trap and lev scap stretches:  3/30\"**  Doorway pec stretches:  NV    Wall walk flex/elev  x 10 ea. **  Cane AAROM:  ext/IR/ER (standing):  x 10/5\" ea. **    NMR:     Scap pinches:  10/5\"    T-band scap depression:  NV  T-band 3 level rows:  NV  T-band lower trap squeeze:  NV  T-band serr ant punch:  NV    Bent over rows:  NV     Education:  poc, anatomy, physiology, posture, body mechanics, safety awareness, HEP.  Preferred learning:  pictures, demonstration.  Demonstrated good understanding.                     Access Code: [email or web address]  URL: [email or web address]  Date: 11/30/2023  Prepared by: Adriana Solitario    Exercises  - Seated Scapular Retraction  - 3-5 x daily - 7 x weekly - 1-2 sets - 10 reps - 5 second hold  - Standing Shoulder Flexion Wall Walk (Mirrored)  - 1 x daily - 7 x weekly - 2-3 sets - 10 reps - 5 second hold  - Standing Shoulder Scaption Wall Walk  - 1 x daily - 7 x weekly - 3 sets - 10 reps  - Standing Shoulder Extension ROM with Dowel  - 1 x daily - 7 x weekly - 3 sets - 10 reps  - Standing Bilateral Shoulder Internal Rotation AAROM with Dowel  - 1 x daily - 7 x weekly - 2-3 sets - 10 reps - 5 second hold  - Standing Shoulder External Rotation AAROM with Dowel (Mirrored)  - 1 x daily - 7 x weekly - 2-3 sets - 10 reps - 5 second hold                    "

## 2023-12-13 ENCOUNTER — TREATMENT (OUTPATIENT)
Dept: PHYSICAL THERAPY | Facility: CLINIC | Age: 78
End: 2023-12-13
Payer: MEDICARE

## 2023-12-13 DIAGNOSIS — M75.81 ROTATOR CUFF TENDONITIS, RIGHT: ICD-10-CM

## 2023-12-13 DIAGNOSIS — M75.81 TENDINITIS OF RIGHT ROTATOR CUFF: ICD-10-CM

## 2023-12-13 PROCEDURE — 97110 THERAPEUTIC EXERCISES: CPT | Mod: GP,CQ,KX

## 2023-12-13 PROCEDURE — 97112 NEUROMUSCULAR REEDUCATION: CPT | Mod: GP,CQ,KX

## 2023-12-13 PROCEDURE — 97140 MANUAL THERAPY 1/> REGIONS: CPT | Mod: GP,CQ,KX

## 2023-12-13 NOTE — PROGRESS NOTES
Physical Therapy  Physical Therapy Progress Note    Patient Name Regina Tam   MRN: [id number]  Today's Date: 12/13/23  Time Calculation  Start Time: 0915  Stop Time: 1005  Time Calculation (min): 50 min    Insurance:    Visit #: 2KX  Insurance Reviewed  MCR  (per information provided by  pre-cert team)   Authorization required:  N  MCR certification date range:  11-30-23/2-27-24  Therapy Diagnoses:   1. Rotator cuff tendonitis, right  Follow Up In Physical Therapy      2. Tendinitis of right rotator cuff  Follow Up In Physical Therapy          General:  Reason for visit: R shoulder tendinitis   Referred by: Cole Killian MD appt:  damián  Preferred Name:  Regina  Script:  PT Eval and treat and HEP  Onset Date:  10-1-23  Assessment:  Patient tolerated treatment:well, required some cueing in correct ex. Performance with tband serratus punch  and scapula depression.  Patient needs continued work on/ posture aware ness and strength in the right shoulder skilled PT for: progression in exercise  and to address remaining functional, objective and subjective deficits to allow them to return to prior /optimal level of function with ADLs.  Patient is progressing with goals: yes  Skilled care:  manual, ex. Progression     Plan:         Continue to progress per poc:   NV add trap squeezes    Subjective:   Patient reports:  she is having pain mostly in the deltoid . Reports since she had her injection the shoulder is much better.  Have you fallen since last visit:  no    Medical Hx/  no fall risk  Precautions: Reviewed medical history form with patient and medical screening assessed. 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   Pain:  4/10  Location/Type of pain:  right shoulder     HEP compliance/understanding:  yes    Objective:   Objective Measurements:    Function:   able to sleep on right shoulder , some difficult with getting coat on and off but does well  "with pulling slacks on .  Posture: rounded shoulders   ROM:  functional ROM with AAROM, difficulty with active  Strength: patient did well with manual resistance in supine and arm in 90 degree position.  Flexibility:      Treatment:   **= HEP  NV= Next visit  np= not performed  nb= non-billable  G= group HEP= discharged to HEP      Therapeutic Exercise:     13 minutes  Nu-step(subjective taken/education):    10 min lev 3     L upper trap and lev scap stretches:  3/30\"**  Doorway pec stretches:  10x **     Wall walk flex/elev  x 10 ea. **  Cane AAROM:  ext/IR/ER (standing):  x 10/5\" ea. **      Neuromuscular Re-education:    20 minutes  Scap pinches:  10/5\"     T-band scap depression:  blue   T-band 3 level rows:   blue 15x **  T-band lower trap squeeze:  NV  T-band serr ant punch:  blue 15x2 **     Manual Therapy:     7 minutes  Supine ROM at the shoulder  and joint mobs grade 4. Stabilization in 90 degree position and manual resistance in all planes    Cold Pack          10 Minutes to rt shoulder supine           Education:  ex progression with POC  HEP Progression:  door stretch , tband  rows   "

## 2023-12-15 ENCOUNTER — ANTICOAGULATION - WARFARIN VISIT (OUTPATIENT)
Dept: CARDIOLOGY | Facility: CLINIC | Age: 78
End: 2023-12-15
Payer: MEDICARE

## 2023-12-15 DIAGNOSIS — I48.91 ATRIAL FIBRILLATION, UNSPECIFIED TYPE (MULTI): ICD-10-CM

## 2023-12-15 LAB
POC INR: 2.1
POC PROTHROMBIN TIME: NORMAL

## 2023-12-15 PROCEDURE — 85610 PROTHROMBIN TIME: CPT | Mod: QW

## 2023-12-15 PROCEDURE — 99211 OFF/OP EST MAY X REQ PHY/QHP: CPT | Performed by: FAMILY MEDICINE

## 2023-12-15 NOTE — PROGRESS NOTES
Patient identification verified with 2 identifiers.    Location: [facility] - [address] [place] [address] [phone]     Referring Physician: Jillian Vizcarra  Enrollment/ Re-enrollment date: 23   INR Goal: 2.0-3.0  INR monitoring is per [facility] protocol.  Anticoagulation Medication: warfarin  Indication: atrial fibrillation    Subjective   Bleeding signs/symptoms: No    Bruising: No   Major bleeding event: No  Thrombosis signs/symptoms: No  Thromboembolic event: No  Missed doses: No  Extra doses: No  Medication changes: No  Dietary changes: No  Change in health: No  Change in activity: No  Alcohol: No  Other concerns: No    Upcoming Surgeries:  Does the Patient Have any upcoming surgeries that require interruption in anticoagulation therapy? no  Does the patient require bridging? no      Anticoagulation Summary  As of 12/15/2023      INR goal:  2.0-3.0   TTR:  69.9 % (2.8 mo)   INR used for dosin.10 (12/15/2023)   Weekly warfarin total:  37.5 mg               Assessment/Plan   Therapeutic     1. New dose: no change.   INR therapeutic.  Will maintain dose.  Patient will return in 4 weeks.    2. Next INR: 1 month      Education provided to patient during the visit:  Patient instructed to call in interim with questions, concerns and changes.

## 2023-12-21 ENCOUNTER — TREATMENT (OUTPATIENT)
Dept: PHYSICAL THERAPY | Facility: CLINIC | Age: 78
End: 2023-12-21
Payer: MEDICARE

## 2023-12-21 DIAGNOSIS — M75.81 TENDINITIS OF RIGHT ROTATOR CUFF: ICD-10-CM

## 2023-12-21 DIAGNOSIS — M75.81 ROTATOR CUFF TENDONITIS, RIGHT: ICD-10-CM

## 2023-12-21 PROCEDURE — 97112 NEUROMUSCULAR REEDUCATION: CPT | Mod: GP,CQ,KX

## 2023-12-21 PROCEDURE — 97140 MANUAL THERAPY 1/> REGIONS: CPT | Mod: GP,CQ,KX

## 2023-12-21 PROCEDURE — 97110 THERAPEUTIC EXERCISES: CPT | Mod: GP,CQ,KX

## 2023-12-21 NOTE — PROGRESS NOTES
Physical Therapy  Physical Therapy Progress Note    Patient Name Regina Tam   MRN: [id number]  Today's Date: 12/21/23  Time Calculation  Start Time: 0915  Stop Time: 1005  Time Calculation (min): 50 min    Insurance:    Visit #: 3KX  Insurance Reviewed  MCR  (per information provided by  pre-cert team)   Authorization required:  N  MCR certification date range:  11-30-23/2-27-24  Therapy Diagnoses:   1. Rotator cuff tendonitis, right  Follow Up In Physical Therapy      2. Tendinitis of right rotator cuff  Follow Up In Physical Therapy          General:  Reason for visit: R shoulder tendinitis   Referred by: Cole Killian MD appt:  damián  Preferred Name:  Regina  Script:  PT Eval and treat and HEP  Onset Date:  10-1-23  Assessment:  Patient tolerated treatment:well, she has been pain free for the past 2 weeks.she was able to progress with shoulder stability.  Patient needs continued work on end range motion and shoulder stability/skilled PT for: progression in her shoulder stability and  to address remaining functional, objective and subjective deficits to allow them to return to prior /optimal level of function with ADLs.  Patient is progressing with goals: yes  Skilled care:  manual, ex progression    Plan:         Continue to progress per poc:   NV add bent rows     Subjective:   Patient reports:  she continues to be doing well since her injection     Have you fallen since last visit:  no    Precautions: Reviewed medical history form with patient and medical screening assessed. 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   Pain:  4/10  Location/Type of pain:  right shoulder      HEP compliance/understanding:  yes     Pain:  0/10  Location/Type of pain:  rt shoulder     HEP compliance/understanding:  yes    Objective:   Objective Measurements:    Function:   able to reach over head now with no pain. Demonstrates functional ROM with  "flexion  Strength: progressed with tband with good tolerance to shoulder stability      Treatment:   **= HEP  NV= Next visit  np= not performed  nb= non-billable  G= group HEP= discharged to HEP      Therapeutic Exercise:     13 minutes    Nu-step(subjective taken/education):    10 min lev 3     L upper trap and lev scap stretches:  3/30\"**  Doorway pec stretches:  10x **     Wall walk flex/elev  x 10 ea. **  Cane AAROM:  ext/IR/ER (standing):  x 10/5\" ea. **    Neuromuscular Re-education:    20 minutes  Scap pinches:  10/5\"     T-band scap depression:  blue 15x  T-band 3 level rows:   blue 15x **  T-band lower trap squeeze:  NV  T-band serr ant punch:  blue 15x2 *  Tband IR/ER /EXT red 10x2  **  Biceps curl 3# 15x2  Wall push ups 10x2     Manual Therapy:  7   7 minutes     Supine ROM at the shoulder  and joint mobs grade 4. Stabilization in 90 degree position and manual resistance in all planes    Cold pack 10 min  Seated to rt shoulder     S        Education:  ex progression withg POC  HEP Progression:  tband ER/IR/EXT, issued red band  "

## 2024-01-04 ENCOUNTER — TREATMENT (OUTPATIENT)
Dept: PHYSICAL THERAPY | Facility: CLINIC | Age: 79
End: 2024-01-04
Payer: MEDICARE

## 2024-01-04 DIAGNOSIS — R26.89 DECREASED FUNCTIONAL MOBILITY: ICD-10-CM

## 2024-01-04 DIAGNOSIS — M75.81 TENDINITIS OF RIGHT ROTATOR CUFF: ICD-10-CM

## 2024-01-04 DIAGNOSIS — M17.11 ARTHRITIS OF RIGHT KNEE: Primary | ICD-10-CM

## 2024-01-04 DIAGNOSIS — M17.12 ARTHRITIS OF LEFT KNEE: Primary | ICD-10-CM

## 2024-01-04 DIAGNOSIS — M75.81 ROTATOR CUFF TENDONITIS, RIGHT: ICD-10-CM

## 2024-01-04 PROCEDURE — 97112 NEUROMUSCULAR REEDUCATION: CPT | Mod: GP,CQ

## 2024-01-04 PROCEDURE — 97110 THERAPEUTIC EXERCISES: CPT | Mod: GP,CQ

## 2024-01-04 RX ORDER — CYCLOBENZAPRINE HCL 10 MG
10 TABLET ORAL NIGHTLY
Qty: 90 TABLET | Refills: 0 | Status: SHIPPED | OUTPATIENT
Start: 2024-01-04 | End: 2024-01-08 | Stop reason: SDUPTHER

## 2024-01-04 NOTE — TELEPHONE ENCOUNTER
PT IS REQUESTING A REFILL OF CYCLOBENZAPRINE TO THE [facility]. HER NEXT APPT IS 3.5.24.

## 2024-01-04 NOTE — PROGRESS NOTES
Physical Therapy  Physical Therapy Progress Note    Patient Name Regina Tam   MRN: [id number]  Today's Date: 01/04/24  Time Calculation  Start Time: 1130  Stop Time: 1210  Time Calculation (min): 40 min    Insurance:    Visit #: 4(3 visits prior to 2024)  Insurance Reviewed  MCR  (per information provided by  pre-cert team)   Authorization required:  N  MCR certification date range:  11-30-23/2-27-24  Therapy Diagnoses:   1. Arthritis of right knee        2. Rotator cuff tendonitis, right  Follow Up In Physical Therapy      3. Tendinitis of right rotator cuff  Follow Up In Physical Therapy      4. Decreased functional mobility            General:  Reason for visit: R shoulder tendinitis   Referred by: Cole Killian MD appt:  damián  Preferred Name:  Regina  Script:  PT Eval and treat and HEP  Onset Date:  10-1-23  Assessment:  Patient tolerated treatment: well, met goal with ROM still lacking full strength  Patient needs continued work on shoulder stability gradually/skilled PT for: instruction in correct exercise progression  and to address remaining functional, objective and subjective deficits to allow them to return to prior /optimal level of function with ADLs.  Patient is progressing with goals: yes ,ROM  Skilled care:  ex progression      Plan:         Continue to progress per poc:   NV add ball on wall for stabilization     Subjective:   Patient reports:  she has been doing much better since her injection.    Have you fallen since last visit:  no         Precautions: No fall risk   History of skin CA on L arm-remote, s/p R RTC repair, B TKR, A fib, depression,  see meds in chart  Pain:  0/10  Location/Type of pain:  no problem in right shoulder    HEP compliance/understanding:  yes    Objective:   Objective Measurements:    Function:   no problem with over head, dressing , house chores  :   ROM:  performed wall wheel well in all planes , full ROM in all planes active  Strength: progressed with  "free wts, some fatigue with scaption but no substitution with motion      Treatment:   **= HEP  NV= Next visit  np= not performed  nb= non-billable  G= group HEP= discharged to HEP      Therapeutic Exercise:     15 minutes     Nu-step(subjective taken/education):    10 min lev 3     L upper trap and lev scap stretches:  3/30\"**  Doorway pec stretches:  10x **  BTW flex/scaption 1# 10x2   BTW biceps curls 3# 10x2      Wall wheel 6 to 12, 8 to 2, 4 to 10 circles 10x each  Cane AAROM:  ext/IR/ER (standing):  x 10/5\" ea. **      Neuromuscular Re-education:    25 minutes  Scap pinches:  10/5\"     T-band scap depression:  blue 15x  T-band 3 level rows:   blue 15x **  T-band lower trap squeeze:  NV  T-band serr ant punch:  blue 15x2 *  Tband IR/ER /EXT/flex  red 10x2  **    Wall push ups 10x2               Education:  ex progression   HEP Progression:  flexion scaption   "

## 2024-01-08 DIAGNOSIS — M17.12 ARTHRITIS OF LEFT KNEE: ICD-10-CM

## 2024-01-08 RX ORDER — CYCLOBENZAPRINE HCL 10 MG
10 TABLET ORAL NIGHTLY
Qty: 90 TABLET | Refills: 3 | Status: SHIPPED | OUTPATIENT
Start: 2024-01-08 | End: 2024-01-19 | Stop reason: ALTCHOICE

## 2024-01-10 ENCOUNTER — APPOINTMENT (OUTPATIENT)
Dept: PHYSICAL THERAPY | Facility: CLINIC | Age: 79
End: 2024-01-10
Payer: MEDICARE

## 2024-01-11 ENCOUNTER — TELEPHONE (OUTPATIENT)
Dept: PRIMARY CARE | Facility: CLINIC | Age: 79
End: 2024-01-11
Payer: MEDICARE

## 2024-01-11 ENCOUNTER — APPOINTMENT (OUTPATIENT)
Dept: CARDIOLOGY | Facility: CLINIC | Age: 79
End: 2024-01-11
Payer: MEDICARE

## 2024-01-11 ENCOUNTER — TELEPHONE (OUTPATIENT)
Dept: CARDIOLOGY | Facility: CLINIC | Age: 79
End: 2024-01-11
Payer: MEDICARE

## 2024-01-11 DIAGNOSIS — M51.26 HERNIATED LUMBAR INTERVERTEBRAL DISC: Primary | ICD-10-CM

## 2024-01-11 RX ORDER — METHYLPREDNISOLONE 4 MG/1
TABLET ORAL
Qty: 21 TABLET | Refills: 0 | Status: SHIPPED | OUTPATIENT
Start: 2024-01-11 | End: 2024-01-19 | Stop reason: ALTCHOICE

## 2024-01-11 NOTE — TELEPHONE ENCOUNTER
Patient's  called to cancel the appointment for today.  He stated patient will call to reschedule at a later date.

## 2024-01-11 NOTE — TELEPHONE ENCOUNTER
PT CALLED STATING SHE FELL ON MONDAY, WENT TO ER ON TUESDAY AND THE PAIN ISNT GOING AWAY. SHE JUST IS LOOKING AT SEEING IF SHE NEEDS AN APPT OR IF THERE IS ANYTHING TO YOU CAN DO. PLEASE ADVISE.

## 2024-01-12 ENCOUNTER — APPOINTMENT (OUTPATIENT)
Dept: PHYSICAL THERAPY | Facility: CLINIC | Age: 79
End: 2024-01-12
Payer: MEDICARE

## 2024-01-15 ENCOUNTER — ANTICOAGULATION - WARFARIN VISIT (OUTPATIENT)
Dept: CARDIOLOGY | Facility: CLINIC | Age: 79
End: 2024-01-15
Payer: MEDICARE

## 2024-01-15 DIAGNOSIS — I48.91 ATRIAL FIBRILLATION, UNSPECIFIED TYPE (MULTI): Primary | ICD-10-CM

## 2024-01-15 NOTE — PROGRESS NOTES
Patient called and rescheduled her missed appointment.  New appointment made for 1/25/24 per patient's request.

## 2024-01-15 NOTE — TELEPHONE ENCOUNTER
Patient called and rescheduled her missed appointment.  New appointment made for 1/25/24 per patient's request

## 2024-01-17 ENCOUNTER — APPOINTMENT (OUTPATIENT)
Dept: PHYSICAL THERAPY | Facility: CLINIC | Age: 79
End: 2024-01-17
Payer: MEDICARE

## 2024-01-19 ENCOUNTER — APPOINTMENT (OUTPATIENT)
Dept: PHYSICAL THERAPY | Facility: CLINIC | Age: 79
End: 2024-01-19
Payer: MEDICARE

## 2024-01-19 ENCOUNTER — TELEMEDICINE (OUTPATIENT)
Dept: PRIMARY CARE | Facility: CLINIC | Age: 79
End: 2024-01-19
Payer: MEDICARE

## 2024-01-19 DIAGNOSIS — H35.3231 EXUDATIVE AGE-RELATED MACULAR DEGENERATION, BILATERAL, WITH ACTIVE CHOROIDAL NEOVASCULARIZATION (MULTI): ICD-10-CM

## 2024-01-19 DIAGNOSIS — C43.62 MALIGNANT MELANOMA OF LEFT FOREARM (MULTI): ICD-10-CM

## 2024-01-19 DIAGNOSIS — I73.9 PERIPHERAL VASCULAR DISEASE, UNSPECIFIED (CMS-HCC): ICD-10-CM

## 2024-01-19 DIAGNOSIS — F32.1 DEPRESSION, MAJOR, SINGLE EPISODE, MODERATE (MULTI): ICD-10-CM

## 2024-01-19 DIAGNOSIS — I48.0 PAROXYSMAL ATRIAL FIBRILLATION (MULTI): ICD-10-CM

## 2024-01-19 DIAGNOSIS — S20.211D CONTUSION OF RIB ON RIGHT SIDE, SUBSEQUENT ENCOUNTER: Primary | ICD-10-CM

## 2024-01-19 PROBLEM — S20.211A CONTUSION OF RIB ON RIGHT SIDE: Status: ACTIVE | Noted: 2024-01-19

## 2024-01-19 PROCEDURE — 99214 OFFICE O/P EST MOD 30 MIN: CPT | Performed by: FAMILY MEDICINE

## 2024-01-19 RX ORDER — METHOCARBAMOL 750 MG/1
750 TABLET, FILM COATED ORAL 4 TIMES DAILY
Qty: 120 TABLET | Refills: 1 | Status: SHIPPED | OUTPATIENT
Start: 2024-01-19 | End: 2024-03-05 | Stop reason: ALTCHOICE

## 2024-01-19 RX ORDER — HYDROCODONE BITARTRATE AND ACETAMINOPHEN 5; 325 MG/1; MG/1
TABLET ORAL
COMMUNITY
Start: 2024-01-14

## 2024-01-19 RX ORDER — TRAMADOL HYDROCHLORIDE 50 MG/1
50 TABLET ORAL EVERY 8 HOURS PRN
Qty: 21 TABLET | Refills: 0 | Status: SHIPPED | OUTPATIENT
Start: 2024-01-19 | End: 2024-01-26

## 2024-01-19 ASSESSMENT — ENCOUNTER SYMPTOMS
LOSS OF SENSATION IN FEET: 0
BACK PAIN: 1
MYALGIAS: 1
ARTHRALGIAS: 1
DEPRESSION: 0
OCCASIONAL FEELINGS OF UNSTEADINESS: 0

## 2024-01-19 ASSESSMENT — PATIENT HEALTH QUESTIONNAIRE - PHQ9
1. LITTLE INTEREST OR PLEASURE IN DOING THINGS: NOT AT ALL
SUM OF ALL RESPONSES TO PHQ9 QUESTIONS 1 AND 2: 0
2. FEELING DOWN, DEPRESSED OR HOPELESS: NOT AT ALL

## 2024-01-19 NOTE — PROGRESS NOTES
Subjective   Patient ID: Regina Tam is a 78 y.o. female who presents for Hospital Follow-up.Chief Complaint_UH:  An interactive audio and video telecommunication system which permits real time communications between the patient (at the originating site) and provider (at the distant site) was utilized to provide this telehealth service.   Virtual or Telephone Consent    An interactive audio and video telecommunication system which permits real time communications between the patient (at the originating site) and provider (at the distant site) was utilized to provide this telehealth service.   Verbal consent was requested and obtained from Regina Tam on this date, 01/19/24 for a telehealth visit.    HPI   Pt fell tripped in her house   Injured her arm and r ribs  Had ct scan chest, adb and chest  Demonstrated thyroid and pulmonary nodules  The flexeril, heat and lidocaine have not helped  Vicodin did not help either  Has been to the Er twice    Review of Systems   Musculoskeletal:  Positive for arthralgias, back pain and myalgias.   All other systems reviewed and are negative.    Chief Complaint_UH:  An interactive audio and video telecommunication system which permits real time communications between the patient (at the originating site) and provider (at the distant site) was utilized to provide this telehealth service.   Virtual or Telephone Consent    An interactive audio and video telecommunication system which permits real time communications between the patient (at the originating site) and provider (at the distant site) was utilized to provide this telehealth service.   Verbal consent was requested and obtained from Regina Tam on this date, 01/19/24 for a telehealth visit.    Objective   There were no vitals taken for this visit.    Physical Exam  Constitutional:       Appearance: Normal appearance.   HENT:      Head: Normocephalic.      Right Ear: External ear normal.      Left Ear:  External ear normal.      Nose: Nose normal.   Pulmonary:      Effort: Pulmonary effort is normal.   Neurological:      General: No focal deficit present.      Mental Status: She is alert and oriented to person, place, and time.   Psychiatric:         Mood and Affect: Mood normal.         Behavior: Behavior normal.         Assessment/Plan   Patient Active Problem List   Diagnosis    Decreased functional mobility    Arthritis    Acquired hammer toe of right foot    Atrial fibrillation (CMS/HCC)    Bilateral high frequency sensorineural hearing loss    Peripheral vascular disease, unspecified (CMS/HCC)    Depression    Arthritis of left knee    Arthritis of right knee    Depression, major, single episode, moderate (CMS/HCC)    Elevated lipids    Foot pain, right    GERD (gastroesophageal reflux disease)    HBP (high blood pressure)    Herniated lumbar intervertebral disc    Malignant melanoma of left forearm (CMS/HCC)    Vitamin D deficiency, unspecified    Metatarsalgia of right foot    Glenohumeral arthritis, left    Pain in joint of left shoulder    Exudative age-related macular degeneration, bilateral, with active choroidal neovascularization (CMS/HCC)    Hypertensive cardiovascular disease    Lung nodules    Mitral regurgitation    Tricuspid regurgitation    Thyroid nodule    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

## 2024-01-22 PROBLEM — I73.9 INTERMITTENT CLAUDICATION (CMS-HCC): Status: ACTIVE | Noted: 2023-01-21

## 2024-01-22 PROBLEM — R26.89 IMPAIRMENT OF BALANCE: Status: ACTIVE | Noted: 2024-01-22

## 2024-01-22 PROBLEM — R60.0 EDEMA OF LOWER EXTREMITY: Status: ACTIVE | Noted: 2024-01-22

## 2024-01-22 PROBLEM — I10 HYPERTENSION: Status: ACTIVE | Noted: 2023-01-21

## 2024-01-22 PROBLEM — M77.10 LATERAL EPICONDYLITIS: Status: ACTIVE | Noted: 2024-01-22

## 2024-01-22 PROBLEM — R05.9 COUGH: Status: ACTIVE | Noted: 2024-01-22

## 2024-01-22 PROBLEM — Z86.79 HISTORY OF CARDIAC ARRHYTHMIA: Status: ACTIVE | Noted: 2024-01-22

## 2024-01-22 PROBLEM — H60.90 OTITIS EXTERNA: Status: ACTIVE | Noted: 2024-01-22

## 2024-01-22 PROBLEM — B35.4 TINEA CORPORIS: Status: ACTIVE | Noted: 2024-01-22

## 2024-01-22 PROBLEM — L30.9 ECZEMA: Status: ACTIVE | Noted: 2022-10-26

## 2024-01-22 PROBLEM — R26.89 ANTALGIC GAIT: Status: ACTIVE | Noted: 2023-01-21

## 2024-01-22 PROBLEM — M21.622 TAILOR'S BUNION OF BOTH FEET: Status: ACTIVE | Noted: 2022-10-20

## 2024-01-22 PROBLEM — R22.0 SWELLING OF FACE: Status: ACTIVE | Noted: 2024-01-22

## 2024-01-22 PROBLEM — G56.00 CARPAL TUNNEL SYNDROME: Status: ACTIVE | Noted: 2024-01-22

## 2024-01-22 PROBLEM — H91.90 HEARING LOSS: Status: ACTIVE | Noted: 2024-01-22

## 2024-01-22 PROBLEM — M21.621 TAILOR'S BUNION OF BOTH FEET: Status: ACTIVE | Noted: 2022-10-20

## 2024-01-22 PROBLEM — Z86.718 HISTORY OF THROMBOSIS: Status: ACTIVE | Noted: 2024-01-22

## 2024-01-22 PROBLEM — J01.90 ACUTE BACTERIAL SINUSITIS: Status: ACTIVE | Noted: 2023-09-12

## 2024-01-22 PROBLEM — M20.30 ACQUIRED HALLUX MALLEUS: Status: ACTIVE | Noted: 2023-01-21

## 2024-01-22 PROBLEM — B96.89 ACUTE BACTERIAL SINUSITIS: Status: ACTIVE | Noted: 2023-09-12

## 2024-01-23 ENCOUNTER — HOSPITAL ENCOUNTER (OUTPATIENT)
Dept: RADIOLOGY | Facility: CLINIC | Age: 79
Discharge: HOME | End: 2024-01-23
Payer: MEDICARE

## 2024-01-23 DIAGNOSIS — S20.211D CONTUSION OF RIB ON RIGHT SIDE, SUBSEQUENT ENCOUNTER: ICD-10-CM

## 2024-01-23 PROCEDURE — 71101 X-RAY EXAM UNILAT RIBS/CHEST: CPT | Mod: RT

## 2024-01-23 PROCEDURE — 71101 X-RAY EXAM UNILAT RIBS/CHEST: CPT | Mod: RIGHT SIDE | Performed by: STUDENT IN AN ORGANIZED HEALTH CARE EDUCATION/TRAINING PROGRAM

## 2024-01-24 ENCOUNTER — DOCUMENTATION (OUTPATIENT)
Dept: PHYSICAL THERAPY | Facility: CLINIC | Age: 79
End: 2024-01-24
Payer: MEDICARE

## 2024-01-24 ENCOUNTER — APPOINTMENT (OUTPATIENT)
Dept: PHYSICAL THERAPY | Facility: CLINIC | Age: 79
End: 2024-01-24
Payer: MEDICARE

## 2024-01-24 NOTE — PROGRESS NOTES
Physical Therapy                 Therapy Communication Note    Patient Name: Regina Tam  MRN: [id number]  Today's Date: 1/24/2024     Discipline: Physical Therapy    Missed Visit Reason:  fell and broke ribs and not ready to resume PT yet    Missed Time: Cancel

## 2024-01-25 ENCOUNTER — APPOINTMENT (OUTPATIENT)
Dept: CARDIOLOGY | Facility: CLINIC | Age: 79
End: 2024-01-25
Payer: MEDICARE

## 2024-01-26 ENCOUNTER — ANTICOAGULATION - WARFARIN VISIT (OUTPATIENT)
Dept: CARDIOLOGY | Facility: CLINIC | Age: 79
End: 2024-01-26
Payer: MEDICARE

## 2024-01-26 ENCOUNTER — APPOINTMENT (OUTPATIENT)
Dept: PHYSICAL THERAPY | Facility: CLINIC | Age: 79
End: 2024-01-26
Payer: MEDICARE

## 2024-01-26 DIAGNOSIS — I48.91 ATRIAL FIBRILLATION, UNSPECIFIED TYPE (MULTI): Primary | ICD-10-CM

## 2024-01-26 LAB
POC INR: 2.2
POC PROTHROMBIN TIME: NORMAL

## 2024-01-26 PROCEDURE — 85610 PROTHROMBIN TIME: CPT | Mod: QW

## 2024-01-26 PROCEDURE — 99211 OFF/OP EST MAY X REQ PHY/QHP: CPT | Performed by: FAMILY MEDICINE

## 2024-01-26 NOTE — PROGRESS NOTES
Patient identification verified with 2 identifiers.    Location: [facility] - [address] [place] [address] [phone]     Referring Physician: Jillian Worthy MD  Enrollment/ Re-enrollment date: 24   INR Goal: 2.0-3.0  INR monitoring is per [facility] protocol.  Anticoagulation Medication: warfarin  Indication: atrial fibrillation    Subjective   Bleeding signs/symptoms: No    Bruising: No   Major bleeding event: No  Thrombosis signs/symptoms: No  Thromboembolic event: No  Missed doses: No  Extra doses: No  Medication changes: No  Dietary changes: No  Change in health: No  Change in activity: No  Alcohol: No  Other concerns: No    Upcoming Surgeries:  Does the Patient Have any upcoming surgeries that require interruption in anticoagulation therapy? no  Does the patient require bridging? no      Anticoagulation Summary  As of 2024      INR goal:  2.0-3.0   TTR:  79.7 % (4.2 mo)   INR used for dosin.20 (2024)   Weekly warfarin total:  37.5 mg               Assessment/Plan   Therapeutic     1. New dose: Will maintain dose and patient will return in 4 weeks    2. Next INR: 1 month      Education provided to patient during the visit:  Patient instructed to call in interim with questions, concerns and changes.

## 2024-01-30 ENCOUNTER — APPOINTMENT (OUTPATIENT)
Dept: PHYSICAL THERAPY | Facility: CLINIC | Age: 79
End: 2024-01-30
Payer: MEDICARE

## 2024-02-01 ENCOUNTER — OFFICE VISIT (OUTPATIENT)
Dept: PRIMARY CARE | Facility: CLINIC | Age: 79
End: 2024-02-01
Payer: MEDICARE

## 2024-02-01 VITALS
HEART RATE: 90 BPM | DIASTOLIC BLOOD PRESSURE: 74 MMHG | OXYGEN SATURATION: 97 % | BODY MASS INDEX: 31.64 KG/M2 | SYSTOLIC BLOOD PRESSURE: 118 MMHG | WEIGHT: 196 LBS

## 2024-02-01 DIAGNOSIS — S22.31XD CLOSED FRACTURE OF ONE RIB OF RIGHT SIDE WITH ROUTINE HEALING, SUBSEQUENT ENCOUNTER: Primary | ICD-10-CM

## 2024-02-01 PROCEDURE — 1125F AMNT PAIN NOTED PAIN PRSNT: CPT | Performed by: FAMILY MEDICINE

## 2024-02-01 PROCEDURE — 1036F TOBACCO NON-USER: CPT | Performed by: FAMILY MEDICINE

## 2024-02-01 PROCEDURE — 3078F DIAST BP <80 MM HG: CPT | Performed by: FAMILY MEDICINE

## 2024-02-01 PROCEDURE — 3074F SYST BP LT 130 MM HG: CPT | Performed by: FAMILY MEDICINE

## 2024-02-01 PROCEDURE — 1123F ACP DISCUSS/DSCN MKR DOCD: CPT | Performed by: FAMILY MEDICINE

## 2024-02-01 PROCEDURE — 1159F MED LIST DOCD IN RCRD: CPT | Performed by: FAMILY MEDICINE

## 2024-02-01 PROCEDURE — 99213 OFFICE O/P EST LOW 20 MIN: CPT | Performed by: FAMILY MEDICINE

## 2024-02-01 ASSESSMENT — ENCOUNTER SYMPTOMS
OCCASIONAL FEELINGS OF UNSTEADINESS: 1
DEPRESSION: 0
LOSS OF SENSATION IN FEET: 0

## 2024-02-01 NOTE — PROGRESS NOTES
Subjective   Patient ID: Regina Tam is a 78 y.o. female who presents for Follow-up (2 WK FOLLOW UP).    HPI Pt has an echo set up for tomm  Her rib fracture is healing  Less pain   Does get some pain w movement    Review of Systems   All other systems reviewed and are negative.      Objective   /74   Pulse 90   Wt 88.9 kg (196 lb)   SpO2 97%   BMI 31.64 kg/m²     Physical Exam  Constitutional:       Appearance: Normal appearance.   HENT:      Head: Normocephalic and atraumatic.      Right Ear: Tympanic membrane, ear canal and external ear normal.      Left Ear: Tympanic membrane, ear canal and external ear normal.      Nose: Nose normal.      Mouth/Throat:      Mouth: Mucous membranes are moist.      Pharynx: Oropharynx is clear.   Eyes:      Extraocular Movements: Extraocular movements intact.      Conjunctiva/sclera: Conjunctivae normal.      Pupils: Pupils are equal, round, and reactive to light.   Cardiovascular:      Rate and Rhythm: Normal rate and regular rhythm.      Pulses: Normal pulses.      Heart sounds: Normal heart sounds.   Pulmonary:      Effort: Pulmonary effort is normal.      Breath sounds: Normal breath sounds.   Abdominal:      General: Abdomen is flat. Bowel sounds are normal.      Palpations: Abdomen is soft.   Genitourinary:     Comments: nl  Musculoskeletal:         General: Normal range of motion.      Cervical back: Normal range of motion and neck supple.   Skin:     General: Skin is warm and dry.      Capillary Refill: Capillary refill takes less than 2 seconds.   Neurological:      General: No focal deficit present.      Mental Status: She is alert and oriented to person, place, and time.   Psychiatric:         Mood and Affect: Mood normal.         Behavior: Behavior normal.         Thought Content: Thought content normal.         Assessment/Plan   Diagnoses and all orders for this visit:  Closed fracture of one rib of right side with routine healing, subsequent  encounter  -     XR ribs right 2 views; Future

## 2024-02-06 ENCOUNTER — HOSPITAL ENCOUNTER (OUTPATIENT)
Dept: RADIOLOGY | Facility: CLINIC | Age: 79
Discharge: HOME | End: 2024-02-06
Payer: MEDICARE

## 2024-02-06 ENCOUNTER — APPOINTMENT (OUTPATIENT)
Dept: PHYSICAL THERAPY | Facility: CLINIC | Age: 79
End: 2024-02-06
Payer: MEDICARE

## 2024-02-06 DIAGNOSIS — S20.211D CONTUSION OF RIB ON RIGHT SIDE, SUBSEQUENT ENCOUNTER: ICD-10-CM

## 2024-02-06 PROCEDURE — 76536 US EXAM OF HEAD AND NECK: CPT | Performed by: RADIOLOGY

## 2024-02-06 PROCEDURE — 76536 US EXAM OF HEAD AND NECK: CPT

## 2024-02-12 ENCOUNTER — OFFICE VISIT (OUTPATIENT)
Dept: ORTHOPEDIC SURGERY | Facility: CLINIC | Age: 79
End: 2024-02-12
Payer: MEDICARE

## 2024-02-12 DIAGNOSIS — M19.031 ARTHRITIS OF RIGHT WRIST: Primary | ICD-10-CM

## 2024-02-12 PROCEDURE — 1125F AMNT PAIN NOTED PAIN PRSNT: CPT | Performed by: ORTHOPAEDIC SURGERY

## 2024-02-12 PROCEDURE — 99213 OFFICE O/P EST LOW 20 MIN: CPT | Performed by: ORTHOPAEDIC SURGERY

## 2024-02-12 PROCEDURE — 1123F ACP DISCUSS/DSCN MKR DOCD: CPT | Performed by: ORTHOPAEDIC SURGERY

## 2024-02-12 PROCEDURE — 1159F MED LIST DOCD IN RCRD: CPT | Performed by: ORTHOPAEDIC SURGERY

## 2024-02-12 PROCEDURE — 1036F TOBACCO NON-USER: CPT | Performed by: ORTHOPAEDIC SURGERY

## 2024-02-12 NOTE — PROGRESS NOTES
Subjective    Patient ID: Regina Tam is a 78 y.o. female.    Chief Complaint: OTHER (RT HAND PAIN)     Last Surgery: No surgery found  Last Surgery Date: No surgery found    HPI  Is a 78-year-old right-hand-dominant female who comes in with a complaint of a right hand injury from about 5 weeks earlier.  She sustained direct impact injury to her right hand at that time.  She was seen in outside hospital where x-rays had revealed no fracture.  She was concerned because she still noticed swelling dorsally near the third MCP joint.  The patient is on Coumadin.    Objective   Ortho Exam  Patient is in no acute distress.  Exam of her right hand and wrist reveals her skin of lip is intact.  She does have moderate swelling dorsally overlying the MCP joint.  There is no warmth erythema.  There is minimal tenderness in this region.  She has adequate range of motion in all of her fingers.    Image Results:  X-rays from the other hospital were personally reviewed.  The patient has arthritis at multiple small joints in her right hand as well as her right wrist, especially at the radiocarpal joint as well as the first CMC joint.  She does have arthritis at the MCP joints as well.    Assessment/Plan   Encounter Diagnoses:  Arthritis of right wrist    Patient was informed the swelling persist because she has a bruised arthritic joint and with her being on Coumadin she will swell more often.  She may use heat to try to decompress the swelling.  She otherwise will follow-up as her symptoms dictate.

## 2024-02-26 ENCOUNTER — APPOINTMENT (OUTPATIENT)
Dept: CARDIOLOGY | Facility: CLINIC | Age: 79
End: 2024-02-26
Payer: MEDICARE

## 2024-02-26 ENCOUNTER — ANTICOAGULATION - WARFARIN VISIT (OUTPATIENT)
Dept: CARDIOLOGY | Facility: CLINIC | Age: 79
End: 2024-02-26
Payer: MEDICARE

## 2024-02-26 DIAGNOSIS — I48.91 ATRIAL FIBRILLATION, UNSPECIFIED TYPE (MULTI): Primary | ICD-10-CM

## 2024-02-26 NOTE — PROGRESS NOTES
Patient called to reschedule her appointment today because she has COVID.  New appointment made for 3/7/24 @ 2021.

## 2024-03-01 DIAGNOSIS — I10 PRIMARY HYPERTENSION: ICD-10-CM

## 2024-03-01 RX ORDER — RAMIPRIL 5 MG/1
5 CAPSULE ORAL DAILY
Qty: 90 CAPSULE | Refills: 3 | Status: SHIPPED | OUTPATIENT
Start: 2024-03-01

## 2024-03-05 ENCOUNTER — OFFICE VISIT (OUTPATIENT)
Dept: PRIMARY CARE | Facility: CLINIC | Age: 79
End: 2024-03-05
Payer: MEDICARE

## 2024-03-05 VITALS
BODY MASS INDEX: 31.82 KG/M2 | SYSTOLIC BLOOD PRESSURE: 114 MMHG | HEIGHT: 66 IN | HEART RATE: 55 BPM | OXYGEN SATURATION: 98 % | WEIGHT: 198 LBS | DIASTOLIC BLOOD PRESSURE: 82 MMHG

## 2024-03-05 DIAGNOSIS — M17.12 ARTHRITIS OF LEFT KNEE: ICD-10-CM

## 2024-03-05 DIAGNOSIS — Z00.00 ROUTINE GENERAL MEDICAL EXAMINATION AT HEALTH CARE FACILITY: ICD-10-CM

## 2024-03-05 DIAGNOSIS — Z00.00 MEDICARE ANNUAL WELLNESS VISIT, SUBSEQUENT: Primary | ICD-10-CM

## 2024-03-05 DIAGNOSIS — I48.91 ATRIAL FIBRILLATION, UNSPECIFIED TYPE (MULTI): ICD-10-CM

## 2024-03-05 DIAGNOSIS — Z12.31 SCREENING MAMMOGRAM FOR BREAST CANCER: ICD-10-CM

## 2024-03-05 DIAGNOSIS — E78.5 ELEVATED LIPIDS: ICD-10-CM

## 2024-03-05 PROCEDURE — 1036F TOBACCO NON-USER: CPT | Performed by: FAMILY MEDICINE

## 2024-03-05 PROCEDURE — G0439 PPPS, SUBSEQ VISIT: HCPCS | Performed by: FAMILY MEDICINE

## 2024-03-05 PROCEDURE — 99214 OFFICE O/P EST MOD 30 MIN: CPT | Performed by: FAMILY MEDICINE

## 2024-03-05 PROCEDURE — 1158F ADVNC CARE PLAN TLK DOCD: CPT | Performed by: FAMILY MEDICINE

## 2024-03-05 PROCEDURE — 3074F SYST BP LT 130 MM HG: CPT | Performed by: FAMILY MEDICINE

## 2024-03-05 PROCEDURE — 1123F ACP DISCUSS/DSCN MKR DOCD: CPT | Performed by: FAMILY MEDICINE

## 2024-03-05 PROCEDURE — 1125F AMNT PAIN NOTED PAIN PRSNT: CPT | Performed by: FAMILY MEDICINE

## 2024-03-05 PROCEDURE — 1170F FXNL STATUS ASSESSED: CPT | Performed by: FAMILY MEDICINE

## 2024-03-05 PROCEDURE — 1159F MED LIST DOCD IN RCRD: CPT | Performed by: FAMILY MEDICINE

## 2024-03-05 PROCEDURE — 3079F DIAST BP 80-89 MM HG: CPT | Performed by: FAMILY MEDICINE

## 2024-03-05 RX ORDER — CYCLOBENZAPRINE HCL 10 MG
10 TABLET ORAL NIGHTLY PRN
Qty: 90 TABLET | Refills: 3 | Status: SHIPPED | OUTPATIENT
Start: 2024-03-05 | End: 2025-02-28

## 2024-03-05 RX ORDER — METOPROLOL SUCCINATE 50 MG/1
50 TABLET, EXTENDED RELEASE ORAL
COMMUNITY
Start: 2024-02-07

## 2024-03-05 RX ORDER — ATORVASTATIN CALCIUM 40 MG/1
40 TABLET, FILM COATED ORAL DAILY
Qty: 90 TABLET | Refills: 1 | Status: SHIPPED | OUTPATIENT
Start: 2024-03-05

## 2024-03-05 ASSESSMENT — ACTIVITIES OF DAILY LIVING (ADL)
BATHING: INDEPENDENT
GROCERY_SHOPPING: INDEPENDENT
DRESSING: INDEPENDENT
TAKING_MEDICATION: INDEPENDENT
MANAGING_FINANCES: INDEPENDENT
DOING_HOUSEWORK: INDEPENDENT

## 2024-03-05 ASSESSMENT — ENCOUNTER SYMPTOMS
DEPRESSION: 0
LOSS OF SENSATION IN FEET: 0
OCCASIONAL FEELINGS OF UNSTEADINESS: 0

## 2024-03-05 NOTE — PROGRESS NOTES
"Subjective   Reason for Visit: Regina Tam is an 78 y.o. female here for a Medicare Wellness visit.     Past Medical, Surgical, and Family History reviewed and updated in chart.    Reviewed all medications by prescribing practitioner or clinical pharmacist (such as prescriptions, OTCs, herbal therapies and supplements) and documented in the medical record.    HPI    Patient Care Team:  Jillian Vizcarra DO as PCP - General (Family Medicine)  Jillian Vizcar[facility] PCP [facility]     Review of Systems    Objective   Vitals:  /82   Pulse 55   Ht 1.676 m (5' 6\")   Wt 89.8 kg (198 lb)   SpO2 98%   BMI 31.96 kg/m²       Physical Exam    Assessment/Plan   Problem List Items Addressed This Visit     Arthritis of left knee    Relevant Medications    cyclobenzaprine (Flexeril) 10 mg tablet    Elevated lipids    Relevant Medications    atorvastatin (Lipitor) 40 mg tablet   Other Visit Diagnoses     Routine general medical examination at health care facility    -  Primary    Screening mammogram for breast cancer        Relevant Orders    BI mammo bilateral screening tomosynthesis             "

## 2024-03-05 NOTE — PROGRESS NOTES
"Subjective   Patient ID: Regina Tam is a 78 y.o. female who presents for Medicare Annual Wellness Visit Subsequent.    HPI   Pt is doing better  Pt had echo and labs looked good  Pt denies cp, sob,edema dizziness  Mood is good  Review of Systems   All other systems reviewed and are negative.      Objective   /82   Pulse 55   Ht 1.676 m (5' 6\")   Wt 89.8 kg (198 lb)   SpO2 98%   BMI 31.96 kg/m²     Physical Exam  Constitutional:       Appearance: Normal appearance.   HENT:      Head: Normocephalic and atraumatic.      Right Ear: Tympanic membrane, ear canal and external ear normal.      Left Ear: Tympanic membrane, ear canal and external ear normal.      Nose: Nose normal.      Mouth/Throat:      Mouth: Mucous membranes are moist.      Pharynx: Oropharynx is clear.   Eyes:      Extraocular Movements: Extraocular movements intact.      Conjunctiva/sclera: Conjunctivae normal.      Pupils: Pupils are equal, round, and reactive to light.   Cardiovascular:      Rate and Rhythm: Normal rate and regular rhythm.      Pulses: Normal pulses.      Heart sounds: Normal heart sounds.   Pulmonary:      Effort: Pulmonary effort is normal.      Breath sounds: Normal breath sounds.   Abdominal:      General: Abdomen is flat. Bowel sounds are normal.      Palpations: Abdomen is soft.   Genitourinary:     Comments: nl  Musculoskeletal:         General: Normal range of motion.      Cervical back: Normal range of motion and neck supple.   Skin:     General: Skin is warm and dry.      Capillary Refill: Capillary refill takes less than 2 seconds.   Neurological:      General: No focal deficit present.      Mental Status: She is alert and oriented to person, place, and time.   Psychiatric:         Mood and Affect: Mood normal.         Behavior: Behavior normal.         Thought Content: Thought content normal.         Assessment/Plan   Diagnoses and all orders for this visit:  Medicare annual wellness visit, " subsequent  Elevated lipids  -     atorvastatin (Lipitor) 40 mg tablet; Take 1 tablet (40 mg) by mouth once daily.  Arthritis of left knee  -     cyclobenzaprine (Flexeril) 10 mg tablet; Take 1 tablet (10 mg) by mouth as needed at bedtime for muscle spasms.  Screening mammogram for breast cancer  -     BI mammo bilateral screening tomosynthesis; Future  Routine general medical examination at health care facility  Atrial fibrillation, unspecified type (CMS/[facility])       Medicare Wellness Billing Compliance Satisfied    *This is a visual tool to show completion of required items on the day of the visit. Green checks will only appear on the date of visit.    Review all medications by prescribing practitioner or clinical pharmacist (such as prescriptions, OTCs, herbal therapies and supplements) documented in the medical record    Past Medical, Surgical, and Family History reviewed and updated in chart    Tobacco Use Reviewed    Alcohol Use Reviewed    Illicit Drug Use Reviewed    PHQ2/9    Falls in Last Year Reviewed    Home Safety Risk Factors Reviewed    Cognitive Impairment Reviewed    Patient Self Assessment and Health Status    Current Diet Reviewed    Exercise Frequency    ADL - Hearing Impairment    ADL - Bathing    ADL - Dressing    ADL - Walks in Home    IADL - Managing Finances    IADL - Grocery Shopping    IADL - Taking Medications    IADL - Doing Housework

## 2024-03-07 ENCOUNTER — ANTICOAGULATION - WARFARIN VISIT (OUTPATIENT)
Dept: CARDIOLOGY | Facility: CLINIC | Age: 79
End: 2024-03-07
Payer: MEDICARE

## 2024-03-07 DIAGNOSIS — I48.91 ATRIAL FIBRILLATION, UNSPECIFIED TYPE (MULTI): Primary | ICD-10-CM

## 2024-03-07 LAB
POC INR: 2.3
POC PROTHROMBIN TIME: NORMAL

## 2024-03-07 PROCEDURE — 85610 PROTHROMBIN TIME: CPT | Mod: QW

## 2024-03-07 PROCEDURE — 99211 OFF/OP EST MAY X REQ PHY/QHP: CPT

## 2024-03-07 NOTE — PROGRESS NOTES
Patient identification verified with 2 identifiers.    Location: [facility] - [address] [place] [address] [phone]     Referring Physician: Jillian Worthy MD  Enrollment/ Re-enrollment date: 24   INR Goal: 2.0-3.0  INR monitoring is per [facility] protocol.  Anticoagulation Medication: warfarin  Indication: atrial fibrillation    Subjective   Bleeding signs/symptoms: No    Bruising: No   Major bleeding event: No  Thrombosis signs/symptoms: No  Thromboembolic event: No  Missed doses: No  Extra doses: No  Medication changes: No  Dietary changes: No  Change in health: No  Change in activity: No  Alcohol: No  Other concerns: No    Upcoming Procedures:  Does the Patient Have any upcoming procedures that require interruption in anticoagulation therapy? no  Does the patient require bridging? no      Anticoagulation Summary  As of 3/7/2024      INR goal:  2.0-3.0   TTR:  84.6 % (5.6 mo)   INR used for dosin.30 (3/7/2024)   Weekly warfarin total:  37.5 mg               Assessment/Plan   Therapeutic     1. New dose: Will maintain dose and patient will return in 4 weeks    2. Next INR: 1 month      Education provided to patient during the visit:  Patient instructed to call in interim with questions, concerns and changes.

## 2024-03-20 ENCOUNTER — DOCUMENTATION (OUTPATIENT)
Dept: PHYSICAL THERAPY | Facility: CLINIC | Age: 79
End: 2024-03-20
Payer: MEDICARE

## 2024-03-20 NOTE — PROGRESS NOTES
Physical Therapy    Discharge Summary    Name: Regina Tam  MRN: [id number]  : 1945  Date: 24    Discharge Summary: PT    Discharge Information: Date of last visit 24    Therapy Summary: Patient cancelled appointments due to fractured ribs and has not called to re-schedule.    Discharge Status: unable to re-assess.      Rehab Discharge Reason: Other see above.

## 2024-04-04 ENCOUNTER — APPOINTMENT (OUTPATIENT)
Dept: CARDIOLOGY | Facility: CLINIC | Age: 79
End: 2024-04-04
Payer: MEDICARE

## 2024-04-04 ENCOUNTER — TELEPHONE (OUTPATIENT)
Dept: CARDIOLOGY | Facility: CLINIC | Age: 79
End: 2024-04-04

## 2024-04-04 ENCOUNTER — OFFICE VISIT (OUTPATIENT)
Dept: ORTHOPEDIC SURGERY | Facility: CLINIC | Age: 79
End: 2024-04-04
Payer: MEDICARE

## 2024-04-04 DIAGNOSIS — G89.29 CHRONIC LEFT SHOULDER PAIN: ICD-10-CM

## 2024-04-04 DIAGNOSIS — M25.512 CHRONIC LEFT SHOULDER PAIN: ICD-10-CM

## 2024-04-04 DIAGNOSIS — M75.81 ROTATOR CUFF TENDONITIS, RIGHT: Primary | ICD-10-CM

## 2024-04-04 PROCEDURE — 1123F ACP DISCUSS/DSCN MKR DOCD: CPT | Performed by: ORTHOPAEDIC SURGERY

## 2024-04-04 PROCEDURE — 20610 DRAIN/INJ JOINT/BURSA W/O US: CPT | Performed by: ORTHOPAEDIC SURGERY

## 2024-04-04 PROCEDURE — 1160F RVW MEDS BY RX/DR IN RCRD: CPT | Performed by: ORTHOPAEDIC SURGERY

## 2024-04-04 PROCEDURE — 99213 OFFICE O/P EST LOW 20 MIN: CPT | Performed by: ORTHOPAEDIC SURGERY

## 2024-04-04 PROCEDURE — 1036F TOBACCO NON-USER: CPT | Performed by: ORTHOPAEDIC SURGERY

## 2024-04-04 PROCEDURE — 1159F MED LIST DOCD IN RCRD: CPT | Performed by: ORTHOPAEDIC SURGERY

## 2024-04-04 RX ORDER — TRIAMCINOLONE ACETONIDE 40 MG/ML
40 INJECTION, SUSPENSION INTRA-ARTICULAR; INTRAMUSCULAR
Status: COMPLETED | OUTPATIENT
Start: 2024-04-04 | End: 2024-04-04

## 2024-04-04 RX ORDER — LIDOCAINE HYDROCHLORIDE 10 MG/ML
2 INJECTION INFILTRATION; PERINEURAL
Status: COMPLETED | OUTPATIENT
Start: 2024-04-04 | End: 2024-04-04

## 2024-04-04 RX ADMIN — LIDOCAINE HYDROCHLORIDE 2 ML: 10 INJECTION INFILTRATION; PERINEURAL at 12:35

## 2024-04-04 RX ADMIN — TRIAMCINOLONE ACETONIDE 40 MG: 40 INJECTION, SUSPENSION INTRA-ARTICULAR; INTRAMUSCULAR at 12:35

## 2024-04-04 NOTE — PROGRESS NOTES
78 seen with left shoulder pain.  Has been having persistent severe sharp shooting pain in the left shoulder.  She has difficulty overhead reaching and lifting activities.  She had a left shoulder injection July 2023 along the anterior aspect of the subacromial bursa and bicep tendon that provided her with relief.    Pleasant and no acute distress.  Left shoulder forward flexion 160°. No effusion or instability. There is positive Neer and Hess impingement. There is subacromial crepitus and tenderness around the subacromial space.  There is biceps tenderness. There is a positive Uintah's test. No acromioclavicular tenderness. Rotator cuff strength is intact but there is discomfort with strength testing. Right shoulder forward flexion 180°. No effusion or instability. No impingement or crepitus or tenderness involving the subacromial space. No biceps or acromioclavicular tenderness. There is intact rotator cuff strength. There is adequate range of motion of the cervical spine without pain. Both upper extremities are well perfused skin is intact and muscle tone is adequate. Elbow flexion and extension and wrist flexion and extension strength are intact.    A discussion about her shoulder was done.  Most of her pain is more subacromial.  Discussion about the bicep tendinitis was discussed and she also does not feel to be having significant symptoms along the bicep at this point.  Treatment options were reviewed and the decision was made to proceed with subacromial injection.  She will avoid aggravating activities and ice or heat and use Tylenol and palpation her symptoms.  Previous MRI has demonstrated rotator cuff tendinopathy but the tendons were intact to the greater and lesser tuberosities.    L Inj/Asp: L subacromial bursa on 4/4/2024 12:35 PM  Indications: pain  Details: 22 G needle, posterior approach  Medications: 40 mg triamcinolone acetonide 40 mg/mL; 2 mL lidocaine 10 mg/mL (1 %)  Procedure, treatment  alternatives, risks and benefits explained, specific risks discussed. Consent was given by the patient.

## 2024-04-04 NOTE — TELEPHONE ENCOUNTER
pt called and CX'd POCT today stating she's holding warfarin for procedure tomorrow. Asked for call back with appt reschedule for next week. Moved to 4/11 at 1300. LM on her machine.

## 2024-04-11 ENCOUNTER — APPOINTMENT (OUTPATIENT)
Dept: CARDIOLOGY | Facility: CLINIC | Age: 79
End: 2024-04-11
Payer: MEDICARE

## 2024-04-11 ENCOUNTER — ANTICOAGULATION - WARFARIN VISIT (OUTPATIENT)
Dept: CARDIOLOGY | Facility: CLINIC | Age: 79
End: 2024-04-11
Payer: MEDICARE

## 2024-04-11 DIAGNOSIS — I48.91 ATRIAL FIBRILLATION, UNSPECIFIED TYPE (MULTI): Primary | ICD-10-CM

## 2024-04-11 NOTE — PROGRESS NOTES
Moved patient's appointment to 4/15/24 since patient was on a dose hold and it has only been 5 days since she resumed her  Coumadin.

## 2024-04-15 ENCOUNTER — ANTICOAGULATION - WARFARIN VISIT (OUTPATIENT)
Dept: CARDIOLOGY | Facility: CLINIC | Age: 79
End: 2024-04-15
Payer: MEDICARE

## 2024-04-15 DIAGNOSIS — I48.91 ATRIAL FIBRILLATION, UNSPECIFIED TYPE (MULTI): Primary | ICD-10-CM

## 2024-04-15 LAB
POC INR: 1.7
POC PROTHROMBIN TIME: NORMAL

## 2024-04-15 PROCEDURE — 85610 PROTHROMBIN TIME: CPT | Mod: QW

## 2024-04-15 PROCEDURE — 99211 OFF/OP EST MAY X REQ PHY/QHP: CPT

## 2024-04-15 NOTE — PROGRESS NOTES
Patient identification verified with 2 identifiers.    Location: [facility] - [address] [place] [address] [phone]     Referring Physician: Jillian Worthy MD  Enrollment/ Re-enrollment date: 24   INR Goal: 2.0-3.0  INR monitoring is per [facility] protocol.  Anticoagulation Medication: warfarin  Indication: atrial fibrillation    Subjective   Bleeding signs/symptoms: No    Bruising: No   Major bleeding event: No  Thrombosis signs/symptoms: No  Thromboembolic event: No  Missed doses: No  Extra doses: No  Medication changes: No  Dietary changes: No  Change in health: No  Change in activity: No  Alcohol: No  Other concerns: No    Upcoming Procedures:  Does the Patient Have any upcoming procedures that require interruption in anticoagulation therapy? no  Does the patient require bridging? no      Anticoagulation Summary  As of 4/15/2024      INR goal:  2.0-3.0   TTR:  78.1% (6.9 mo)   INR used for dosin.70 (4/15/2024)   Weekly warfarin total:  37.5 mg               Assessment/Plan   Subtherapeutic     1. New dose: Will maintain dose and patient will return in 1 week.    2. Next INR: 1 week      Education provided to patient during the visit:  Patient instructed to call in interim with questions, concerns and changes.

## 2024-04-22 ENCOUNTER — ANTICOAGULATION - WARFARIN VISIT (OUTPATIENT)
Dept: CARDIOLOGY | Facility: CLINIC | Age: 79
End: 2024-04-22
Payer: MEDICARE

## 2024-04-22 DIAGNOSIS — I48.91 ATRIAL FIBRILLATION, UNSPECIFIED TYPE (MULTI): Primary | ICD-10-CM

## 2024-04-22 LAB
POC INR: 2.2
POC PROTHROMBIN TIME: NORMAL

## 2024-04-22 PROCEDURE — 85610 PROTHROMBIN TIME: CPT | Mod: QW

## 2024-04-22 NOTE — PROGRESS NOTES
Patient identification verified with 2 identifiers.    Location: [facility] - [address] [place] [address] [phone]     Referring Physician: Jillian Worthy MD  Enrollment/ Re-enrollment date: 11/17/24   INR Goal: 2.0-3.0  INR monitoring is per [facility] protocol.  Anticoagulation Medication: warfarin  Indication: atrial fibrillation    Subjective   Bleeding signs/symptoms: No    Bruising: No   Major bleeding event: No  Thrombosis signs/symptoms: No  Thromboembolic event: No  Missed doses: No  Extra doses: No  Medication changes: No  Dietary changes: No  Change in health: No  Change in activity: No  Alcohol: No  Other concerns: No    Upcoming Procedures:  Does the Patient Have any upcoming procedures that require interruption in anticoagulation therapy? no  Does the patient require bridging? no      Anticoagulation Summary  As of 4/22/2024      INR goal:  2.0-3.0   TTR:  78.1% (6.9 mo)   INR used for dosing:                 Assessment/Plan   Therapeutic     1. New dose: Will maintain dose and patient will return in 4 weeks.    2. Next INR: 1 month      Education provided to patient during the visit:  Patient instructed to call in interim with questions, concerns and changes.

## 2024-05-01 ENCOUNTER — TELEPHONE (OUTPATIENT)
Dept: PRIMARY CARE | Facility: CLINIC | Age: 79
End: 2024-05-01
Payer: MEDICARE

## 2024-05-13 ENCOUNTER — ANTICOAGULATION - WARFARIN VISIT (OUTPATIENT)
Dept: CARDIOLOGY | Facility: CLINIC | Age: 79
End: 2024-05-13
Payer: MEDICARE

## 2024-05-13 ENCOUNTER — HOSPITAL ENCOUNTER (OUTPATIENT)
Dept: RADIOLOGY | Facility: CLINIC | Age: 79
Discharge: HOME | End: 2024-05-13
Payer: MEDICARE

## 2024-05-13 VITALS — HEIGHT: 66 IN | BODY MASS INDEX: 31.18 KG/M2 | WEIGHT: 194 LBS

## 2024-05-13 DIAGNOSIS — I48.91 ATRIAL FIBRILLATION, UNSPECIFIED TYPE (MULTI): Primary | ICD-10-CM

## 2024-05-13 DIAGNOSIS — Z12.31 SCREENING MAMMOGRAM FOR BREAST CANCER: ICD-10-CM

## 2024-05-13 PROCEDURE — 77067 SCR MAMMO BI INCL CAD: CPT

## 2024-05-13 PROCEDURE — 77063 BREAST TOMOSYNTHESIS BI: CPT | Performed by: RADIOLOGY

## 2024-05-13 PROCEDURE — 77067 SCR MAMMO BI INCL CAD: CPT | Performed by: RADIOLOGY

## 2024-05-14 ENCOUNTER — HOSPITAL ENCOUNTER (OUTPATIENT)
Dept: RADIOLOGY | Facility: CLINIC | Age: 79
Discharge: HOME | End: 2024-05-14
Payer: MEDICARE

## 2024-05-14 ENCOUNTER — OFFICE VISIT (OUTPATIENT)
Dept: PRIMARY CARE | Facility: CLINIC | Age: 79
End: 2024-05-14
Payer: MEDICARE

## 2024-05-14 VITALS
BODY MASS INDEX: 31.47 KG/M2 | SYSTOLIC BLOOD PRESSURE: 114 MMHG | HEART RATE: 140 BPM | OXYGEN SATURATION: 97 % | WEIGHT: 195 LBS | DIASTOLIC BLOOD PRESSURE: 74 MMHG

## 2024-05-14 DIAGNOSIS — J18.9 COMMUNITY ACQUIRED PNEUMONIA, UNSPECIFIED LATERALITY: Primary | ICD-10-CM

## 2024-05-14 DIAGNOSIS — J18.9 COMMUNITY ACQUIRED PNEUMONIA, UNSPECIFIED LATERALITY: ICD-10-CM

## 2024-05-14 PROBLEM — E66.811 OBESITY, CLASS I, BMI 30-34.9: Status: ACTIVE | Noted: 2024-02-07

## 2024-05-14 PROBLEM — E66.9 OBESITY, CLASS I, BMI 30-34.9: Status: ACTIVE | Noted: 2024-02-07

## 2024-05-14 PROCEDURE — 3074F SYST BP LT 130 MM HG: CPT | Performed by: FAMILY MEDICINE

## 2024-05-14 PROCEDURE — 1036F TOBACCO NON-USER: CPT | Performed by: FAMILY MEDICINE

## 2024-05-14 PROCEDURE — 1160F RVW MEDS BY RX/DR IN RCRD: CPT | Performed by: FAMILY MEDICINE

## 2024-05-14 PROCEDURE — 71046 X-RAY EXAM CHEST 2 VIEWS: CPT

## 2024-05-14 PROCEDURE — 1123F ACP DISCUSS/DSCN MKR DOCD: CPT | Performed by: FAMILY MEDICINE

## 2024-05-14 PROCEDURE — 1159F MED LIST DOCD IN RCRD: CPT | Performed by: FAMILY MEDICINE

## 2024-05-14 PROCEDURE — 71046 X-RAY EXAM CHEST 2 VIEWS: CPT | Performed by: STUDENT IN AN ORGANIZED HEALTH CARE EDUCATION/TRAINING PROGRAM

## 2024-05-14 PROCEDURE — 99213 OFFICE O/P EST LOW 20 MIN: CPT | Performed by: FAMILY MEDICINE

## 2024-05-14 PROCEDURE — 3078F DIAST BP <80 MM HG: CPT | Performed by: FAMILY MEDICINE

## 2024-05-14 ASSESSMENT — ENCOUNTER SYMPTOMS
LOSS OF SENSATION IN FEET: 0
OCCASIONAL FEELINGS OF UNSTEADINESS: 0
DEPRESSION: 0

## 2024-05-14 ASSESSMENT — PATIENT HEALTH QUESTIONNAIRE - PHQ9
SUM OF ALL RESPONSES TO PHQ9 QUESTIONS 1 AND 2: 0
2. FEELING DOWN, DEPRESSED OR HOPELESS: NOT AT ALL
1. LITTLE INTEREST OR PLEASURE IN DOING THINGS: NOT AT ALL
2. FEELING DOWN, DEPRESSED OR HOPELESS: NOT AT ALL
SUM OF ALL RESPONSES TO PHQ9 QUESTIONS 1 AND 2: 0
1. LITTLE INTEREST OR PLEASURE IN DOING THINGS: NOT AT ALL

## 2024-05-14 ASSESSMENT — ANXIETY QUESTIONNAIRES
IF YOU CHECKED OFF ANY PROBLEMS ON THIS QUESTIONNAIRE, HOW DIFFICULT HAVE THESE PROBLEMS MADE IT FOR YOU TO DO YOUR WORK, TAKE CARE OF THINGS AT HOME, OR GET ALONG WITH OTHER PEOPLE: NOT DIFFICULT AT ALL
IF YOU CHECKED OFF ANY PROBLEMS ON THIS QUESTIONNAIRE, HOW DIFFICULT HAVE THESE PROBLEMS MADE IT FOR YOU TO DO YOUR WORK, TAKE CARE OF THINGS AT HOME, OR GET ALONG WITH OTHER PEOPLE: NOT DIFFICULT AT ALL
5. BEING SO RESTLESS THAT IT IS HARD TO SIT STILL: NOT AT ALL
7. FEELING AFRAID AS IF SOMETHING AWFUL MIGHT HAPPEN: NOT AT ALL
2. NOT BEING ABLE TO STOP OR CONTROL WORRYING: NOT AT ALL
GAD7 TOTAL SCORE: 0
3. WORRYING TOO MUCH ABOUT DIFFERENT THINGS: NOT AT ALL
4. TROUBLE RELAXING: NOT AT ALL
6. BECOMING EASILY ANNOYED OR IRRITABLE: NOT AT ALL
1. FEELING NERVOUS, ANXIOUS, OR ON EDGE: NOT AT ALL

## 2024-05-14 NOTE — PROGRESS NOTES
Subjective   Patient ID: Regina Tam is a 78 y.o. female who presents for ER /FUV PNUEMONIA.    Pt is doing better  Had pneumonia   Took doxy, tessalon,and cefpideme         Review of Systems   All other systems reviewed and are negative.      Objective   /74   Pulse (!) 140   Wt 88.5 kg (195 lb)   SpO2 97%   BMI 31.47 kg/m²     Physical Exam  Constitutional:       Appearance: Normal appearance.   HENT:      Head: Normocephalic and atraumatic.      Right Ear: Tympanic membrane, ear canal and external ear normal.      Left Ear: Tympanic membrane, ear canal and external ear normal.      Nose: Nose normal.      Mouth/Throat:      Mouth: Mucous membranes are moist.      Pharynx: Oropharynx is clear.   Eyes:      Extraocular Movements: Extraocular movements intact.      Conjunctiva/sclera: Conjunctivae normal.      Pupils: Pupils are equal, round, and reactive to light.   Cardiovascular:      Rate and Rhythm: Normal rate and regular rhythm.      Pulses: Normal pulses.      Heart sounds: Normal heart sounds.   Pulmonary:      Effort: Pulmonary effort is normal.      Breath sounds: Normal breath sounds.   Abdominal:      General: Abdomen is flat. Bowel sounds are normal.      Palpations: Abdomen is soft.   Genitourinary:     Comments: nl  Musculoskeletal:         General: Normal range of motion.      Cervical back: Normal range of motion and neck supple.   Skin:     General: Skin is warm and dry.      Capillary Refill: Capillary refill takes less than 2 seconds.   Neurological:      General: No focal deficit present.      Mental Status: She is alert and oriented to person, place, and time.   Psychiatric:         Mood and Affect: Mood normal.         Behavior: Behavior normal.         Thought Content: Thought content normal.         Assessment/Plan   Diagnoses and all orders for this visit:  Community acquired pneumonia, unspecified laterality  -     XR chest 2 views; Future    resolved

## 2024-05-22 ENCOUNTER — ANTICOAGULATION - WARFARIN VISIT (OUTPATIENT)
Dept: CARDIOLOGY | Facility: CLINIC | Age: 79
End: 2024-05-22
Payer: MEDICARE

## 2024-05-22 DIAGNOSIS — I48.91 ATRIAL FIBRILLATION, UNSPECIFIED TYPE (MULTI): Primary | ICD-10-CM

## 2024-05-22 LAB
POC INR: 3.2
POC PROTHROMBIN TIME: NORMAL

## 2024-05-22 PROCEDURE — 85610 PROTHROMBIN TIME: CPT | Mod: QW

## 2024-05-22 PROCEDURE — 99211 OFF/OP EST MAY X REQ PHY/QHP: CPT

## 2024-05-22 NOTE — PROGRESS NOTES
Patient identification verified with 2 identifiers.    Location: [facility] - [address] [place] [address] [phone]     Referring Physician: Jillian Worthy MD  Enrollment/ Re-enrollment date: 11/17/24   INR Goal: 2.0-3.0  INR monitoring is per [facility] protocol.  Anticoagulation Medication: warfarin  Indication: atrial fibrillation    Subjective   Bleeding signs/symptoms: No  Bruising: No   Major bleeding event: No  Thrombosis signs/symptoms: No  Thromboembolic event: No  Missed doses: No  Extra doses: No  Medication changes: Yes  Dietary changes: No  Change in health: No  Change in activity: No  Alcohol: No  Other concerns: No    Upcoming Procedures:  Does the Patient Have any upcoming procedures that require interruption in anticoagulation therapy? no  Does the patient require bridging? no      Anticoagulation Summary  As of 5/22/2024      INR goal:  2.0-3.0   TTR:  77.2% (8.1 mo)   INR used for dosing:  3.20 (5/22/2024)   Weekly warfarin total:  37.5 mg               Assessment/Plan   Supratherapeutic     1. New dose: no change    2. Next INR: 2 WEEKS DUE TO UPCOMING HOLIDAY      Education provided to patient during the visit:  Patient instructed to call in interim with questions, concerns and changes.   Patient educated on interactions between medications and warfarin.   Patient educated on dietary consistency in vitamin k consumption.   Patient educated on affects of alcohol consumption while taking warfarin.   Patient educated on signs of bleeding/clotting.   Patient educated on compliance with dosing, follow up appointments, and prescribed plan of care.

## 2024-05-23 ENCOUNTER — APPOINTMENT (OUTPATIENT)
Dept: CARDIOLOGY | Facility: CLINIC | Age: 79
End: 2024-05-23
Payer: MEDICARE

## 2024-06-06 ENCOUNTER — ANTICOAGULATION - WARFARIN VISIT (OUTPATIENT)
Dept: CARDIOLOGY | Facility: CLINIC | Age: 79
End: 2024-06-06
Payer: MEDICARE

## 2024-06-06 DIAGNOSIS — I48.91 ATRIAL FIBRILLATION, UNSPECIFIED TYPE (MULTI): Primary | ICD-10-CM

## 2024-06-06 LAB
POC INR: 2.1
POC PROTHROMBIN TIME: NORMAL

## 2024-06-06 PROCEDURE — 85610 PROTHROMBIN TIME: CPT | Mod: QW

## 2024-06-06 PROCEDURE — 99211 OFF/OP EST MAY X REQ PHY/QHP: CPT

## 2024-06-06 NOTE — PROGRESS NOTES
Patient identification verified with 2 identifiers.    Location: [facility] - [address] [place] [address] [phone]     Referring Physician: Jillian Worthy MD  Enrollment/ Re-enrollment date: 24   INR Goal: 2.0-3.0  INR monitoring is per [facility] protocol.  Anticoagulation Medication: warfarin  Indication: atrial fibrillation    Subjective   Bleeding signs/symptoms: No    Bruising: No   Major bleeding event: No  Thrombosis signs/symptoms: No  Thromboembolic event: No  Missed doses: No  Extra doses: No  Medication changes: No  Dietary changes: No  Change in health: No  Change in activity: No  Alcohol: No  Other concerns: No    Upcoming Procedures:  Does the Patient Have any upcoming procedures that require interruption in anticoagulation therapy? no  Does the patient require bridging? no      Anticoagulation Summary  As of 2024      INR goal:  2.0-3.0   TTR:  77.5% (8.6 mo)   INR used for dosin.10 (2024)   Weekly warfarin total:  37.5 mg               Assessment/Plan   Therapeutic     1. New dose: Will maintain dose and patient will return in 4 weeks.    2. Next INR: 1 month      Education provided to patient during the visit:  Patient instructed to call in interim with questions, concerns and changes.

## 2024-07-11 ENCOUNTER — ANTICOAGULATION - WARFARIN VISIT (OUTPATIENT)
Dept: CARDIOLOGY | Facility: CLINIC | Age: 79
End: 2024-07-11
Payer: MEDICARE

## 2024-07-11 DIAGNOSIS — I48.91 ATRIAL FIBRILLATION, UNSPECIFIED TYPE (MULTI): Primary | ICD-10-CM

## 2024-07-11 LAB
POC INR: 3.2
POC PROTHROMBIN TIME: NORMAL

## 2024-07-11 PROCEDURE — 85610 PROTHROMBIN TIME: CPT | Mod: QW

## 2024-07-11 PROCEDURE — 99211 OFF/OP EST MAY X REQ PHY/QHP: CPT

## 2024-07-11 NOTE — PROGRESS NOTES
Patient identification verified with 2 identifiers.    Location: [facility] - [address] [place] [id number] [phone]     Referring Physician: Jillian Worthy MD  Enrollment/ Re-enrollment date: 11/17/24   INR Goal: 2.0-3.0  INR monitoring is per [facility] protocol.  Anticoagulation Medication: warfarin  Indication: atrial fibrillation    Subjective   Bleeding signs/symptoms: No    Bruising: No   Major bleeding event: No  Thrombosis signs/symptoms: No  Thromboembolic event: No  Missed doses: No  Extra doses: No  Medication changes: No  Dietary changes: Yes  LESS GREEN VEGETABLES  Change in health: No  Change in activity: No  Alcohol: No  Other concerns: No    Upcoming Procedures:  Does the Patient Have any upcoming procedures that require interruption in anticoagulation therapy? no  Does the patient require bridging? no      Anticoagulation Summary  As of 7/11/2024      INR goal:  2.0-3.0   TTR:  78.0% (9.8 mo)   INR used for dosing:  3.20 (7/11/2024)   Weekly warfarin total:  37.5 mg               Assessment/Plan   SUPRA THERAPEUTIC IN SETTING OF LESS GREEN VEGETABLES.  WILL MAINTAIN DOSE AND RTC IN 1 WEEK.          Education provided to patient during the visit:  Patient instructed to call in interim with questions, concerns and changes.

## 2024-07-18 ENCOUNTER — ANTICOAGULATION - WARFARIN VISIT (OUTPATIENT)
Dept: CARDIOLOGY | Facility: CLINIC | Age: 79
End: 2024-07-18
Payer: MEDICARE

## 2024-07-18 DIAGNOSIS — I48.91 ATRIAL FIBRILLATION, UNSPECIFIED TYPE (MULTI): Primary | ICD-10-CM

## 2024-07-18 LAB
POC INR: 2.7
POC PROTHROMBIN TIME: NORMAL

## 2024-07-18 PROCEDURE — 85610 PROTHROMBIN TIME: CPT | Mod: QW

## 2024-07-18 PROCEDURE — 99211 OFF/OP EST MAY X REQ PHY/QHP: CPT

## 2024-07-18 NOTE — PROGRESS NOTES
Patient identification verified with 2 identifiers.    Location: [facility] - [address] [place] [address] [phone]     Referring Physician: Jillian Worthy MD  Enrollment/ Re-enrollment date: 24   INR Goal: 2.0-3.0  INR monitoring is per [facility] protocol.  Anticoagulation Medication: warfarin  Indication: atrial fibrillation    Subjective   Bleeding signs/symptoms: No    Bruising: No   Major bleeding event: No  Thrombosis signs/symptoms: No  Thromboembolic event: No  Missed doses: No  Extra doses: No  Medication changes: No  Dietary changes: No  Change in health: No  Change in activity: No  Alcohol: No  Other concerns: No    Upcoming Procedures:  Does the Patient Have any upcoming procedures that require interruption in anticoagulation therapy? no  Does the patient require bridging? no      Anticoagulation Summary  As of 2024      INR goal:  2.0-3.0   TTR:  77.6% (10 mo)   INR used for dosin.70 (2024)   Weekly warfarin total:  37.5 mg               Assessment/Plan   Therapeutic     1. New dose: Will maintain dose and patient will return in 4 weeks.    2. Next INR: 1 month      Education provided to patient during the visit:  Patient instructed to call in interim with questions, concerns and changes.

## 2024-07-23 ENCOUNTER — HOSPITAL ENCOUNTER (OUTPATIENT)
Dept: RADIOLOGY | Facility: CLINIC | Age: 79
Discharge: HOME | End: 2024-07-23
Payer: MEDICARE

## 2024-07-23 ENCOUNTER — APPOINTMENT (OUTPATIENT)
Dept: ORTHOPEDIC SURGERY | Facility: CLINIC | Age: 79
End: 2024-07-23
Payer: MEDICARE

## 2024-07-23 VITALS — BODY MASS INDEX: 31.34 KG/M2 | HEIGHT: 66 IN | WEIGHT: 195 LBS

## 2024-07-23 DIAGNOSIS — M75.81 ROTATOR CUFF TENDONITIS, RIGHT: ICD-10-CM

## 2024-07-23 DIAGNOSIS — M75.82 ROTATOR CUFF TENDINITIS, LEFT: Primary | ICD-10-CM

## 2024-07-23 DIAGNOSIS — M25.512 ACUTE PAIN OF LEFT SHOULDER: ICD-10-CM

## 2024-07-23 PROCEDURE — 1159F MED LIST DOCD IN RCRD: CPT | Performed by: ORTHOPAEDIC SURGERY

## 2024-07-23 PROCEDURE — 73030 X-RAY EXAM OF SHOULDER: CPT | Mod: LT

## 2024-07-23 PROCEDURE — 99213 OFFICE O/P EST LOW 20 MIN: CPT | Performed by: ORTHOPAEDIC SURGERY

## 2024-07-23 PROCEDURE — 1036F TOBACCO NON-USER: CPT | Performed by: ORTHOPAEDIC SURGERY

## 2024-07-23 PROCEDURE — 1123F ACP DISCUSS/DSCN MKR DOCD: CPT | Performed by: ORTHOPAEDIC SURGERY

## 2024-07-23 PROCEDURE — 73030 X-RAY EXAM OF SHOULDER: CPT | Mod: LEFT SIDE | Performed by: RADIOLOGY

## 2024-07-23 PROCEDURE — 1160F RVW MEDS BY RX/DR IN RCRD: CPT | Performed by: ORTHOPAEDIC SURGERY

## 2024-07-24 PROCEDURE — 20610 DRAIN/INJ JOINT/BURSA W/O US: CPT | Performed by: ORTHOPAEDIC SURGERY

## 2024-07-24 RX ORDER — TRIAMCINOLONE ACETONIDE 40 MG/ML
40 INJECTION, SUSPENSION INTRA-ARTICULAR; INTRAMUSCULAR
Status: COMPLETED | OUTPATIENT
Start: 2024-07-24 | End: 2024-07-24

## 2024-07-24 RX ORDER — LIDOCAINE HYDROCHLORIDE 10 MG/ML
2 INJECTION INFILTRATION; PERINEURAL
Status: COMPLETED | OUTPATIENT
Start: 2024-07-24 | End: 2024-07-24

## 2024-07-24 NOTE — PROGRESS NOTES
78-year-old is seen for her left shoulder.  She has been having persistent moderate throbbing pain in the left shoulder.  She has difficulty with overhead reaching and lifting activities.  She had cortisone injection in April that gave her good relief.  She fell about a week ago.  She previously has had an MRI demonstrating rotator cuff tendinopathy but tendons were intact to the greater and lesser tuberosities.    Pleasant and no acute distress.  Left shoulder forward flexion 160°. No effusion or instability. There is positive Neer and Hess impingement. There is subacromial crepitus and tenderness around the subacromial space. No biceps tenderness. No acromioclavicular tenderness. Rotator cuff strength is intact but there is discomfort with strength testing. Right shoulder forward flexion 180°. No effusion or instability. No impingement or crepitus or tenderness involving the subacromial space. No biceps or acromioclavicular tenderness. There is intact rotator cuff strength. There is adequate range of motion of the cervical spine without pain. Both upper extremities are well perfused skin is intact and muscle tone is adequate. Elbow flexion and extension and wrist flexion and extension strength are intact.    A discussion about rotator cuff problems was done.  Treatment options were reviewed.  Decision was made proceed with cortisone injection.  She will ice or heat and use Tylenol and avoid aggravating activities.  Follow-up based on her symptoms.    L Inj/Asp: L subacromial bursa on 7/24/2024 1:18 PM  Indications: pain  Details: 22 G needle, posterior approach  Medications: 40 mg triamcinolone acetonide 40 mg/mL; 2 mL lidocaine 10 mg/mL (1 %)  Procedure, treatment alternatives, risks and benefits explained, specific risks discussed. Consent was given by the patient.

## 2024-08-15 ENCOUNTER — HOSPITAL ENCOUNTER (OUTPATIENT)
Dept: RADIOLOGY | Facility: CLINIC | Age: 79
Discharge: HOME | End: 2024-08-15
Payer: MEDICARE

## 2024-08-15 ENCOUNTER — ANTICOAGULATION - WARFARIN VISIT (OUTPATIENT)
Dept: CARDIOLOGY | Facility: CLINIC | Age: 79
End: 2024-08-15
Payer: MEDICARE

## 2024-08-15 DIAGNOSIS — I48.91 ATRIAL FIBRILLATION, UNSPECIFIED TYPE (MULTI): Primary | ICD-10-CM

## 2024-08-15 DIAGNOSIS — R05.1 ACUTE COUGH: ICD-10-CM

## 2024-08-15 LAB
POC INR: 2.4
POC PROTHROMBIN TIME: NORMAL

## 2024-08-15 PROCEDURE — 71046 X-RAY EXAM CHEST 2 VIEWS: CPT | Performed by: RADIOLOGY

## 2024-08-15 PROCEDURE — 85610 PROTHROMBIN TIME: CPT | Mod: QW

## 2024-08-15 PROCEDURE — 99211 OFF/OP EST MAY X REQ PHY/QHP: CPT

## 2024-08-15 PROCEDURE — 71046 X-RAY EXAM CHEST 2 VIEWS: CPT

## 2024-08-15 NOTE — PROGRESS NOTES
Asking to have his Klonopin refilled one day early.  Wife said he has only a dose left for tonight.  Can he please get his refill one day early.  I spoke with Dr. Bo and it is ok to have the Klonopin refill on 07-, instead of 07-.  I will call the pharmacy and give them the message.  Meño SAN.  [facility]  July 16, 2018 5:08 PM     Patient identification verified with 2 identifiers.    Location: [facility] - [address] [place] [address] [phone]     Referring Physician: Jillian Worthy MD  Enrollment/ Re-enrollment date: 11/17/24   INR Goal: 2.0-3.0  INR monitoring is per [facility] protocol.  Anticoagulation Medication: warfarin  Indication: atrial fibrillation    Subjective   Bleeding signs/symptoms: No    Bruising: No   Major bleeding event: No  Thrombosis signs/symptoms: No  Thromboembolic event: No  Missed doses: No  Extra doses: No  Medication changes: No  Dietary changes: No  Change in health: No  Change in activity: No  Alcohol: No  Other concerns: No    Upcoming Procedures:  Does the Patient Have any upcoming procedures that require interruption in anticoagulation therapy? no  Does the patient require bridging? no      Anticoagulation Summary  As of 8/15/2024      INR goal:  2.0-3.0   TTR:  77.6% (10 mo)   INR used for dosing:  --               Assessment/Plan   Therapeutic     1. New dose: Will maintain dose and patient will return in 4 weeks.    2. Next INR: 1 month      Education provided to patient during the visit:  Patient instructed to call in interim with questions, concerns and changes.

## 2024-09-03 ENCOUNTER — APPOINTMENT (OUTPATIENT)
Dept: PRIMARY CARE | Facility: CLINIC | Age: 79
End: 2024-09-03
Payer: MEDICARE

## 2024-09-10 ENCOUNTER — APPOINTMENT (OUTPATIENT)
Dept: PRIMARY CARE | Facility: CLINIC | Age: 79
End: 2024-09-10
Payer: MEDICARE

## 2024-09-10 VITALS
OXYGEN SATURATION: 94 % | DIASTOLIC BLOOD PRESSURE: 78 MMHG | HEART RATE: 61 BPM | SYSTOLIC BLOOD PRESSURE: 130 MMHG | WEIGHT: 201.6 LBS | BODY MASS INDEX: 32.54 KG/M2

## 2024-09-10 DIAGNOSIS — M17.12 ARTHRITIS OF LEFT KNEE: ICD-10-CM

## 2024-09-10 DIAGNOSIS — K21.9 GASTROESOPHAGEAL REFLUX DISEASE WITHOUT ESOPHAGITIS: ICD-10-CM

## 2024-09-10 DIAGNOSIS — I48.0 PAROXYSMAL ATRIAL FIBRILLATION (MULTI): ICD-10-CM

## 2024-09-10 DIAGNOSIS — F32.1 DEPRESSION, MAJOR, SINGLE EPISODE, MODERATE (MULTI): ICD-10-CM

## 2024-09-10 DIAGNOSIS — I48.91 ATRIAL FIBRILLATION, UNSPECIFIED TYPE (MULTI): ICD-10-CM

## 2024-09-10 DIAGNOSIS — E78.5 ELEVATED LIPIDS: ICD-10-CM

## 2024-09-10 PROCEDURE — 90662 IIV NO PRSV INCREASED AG IM: CPT | Performed by: FAMILY MEDICINE

## 2024-09-10 PROCEDURE — 1160F RVW MEDS BY RX/DR IN RCRD: CPT | Performed by: FAMILY MEDICINE

## 2024-09-10 PROCEDURE — 3075F SYST BP GE 130 - 139MM HG: CPT | Performed by: FAMILY MEDICINE

## 2024-09-10 PROCEDURE — 1036F TOBACCO NON-USER: CPT | Performed by: FAMILY MEDICINE

## 2024-09-10 PROCEDURE — G0008 ADMIN INFLUENZA VIRUS VAC: HCPCS | Performed by: FAMILY MEDICINE

## 2024-09-10 PROCEDURE — 1123F ACP DISCUSS/DSCN MKR DOCD: CPT | Performed by: FAMILY MEDICINE

## 2024-09-10 PROCEDURE — 3078F DIAST BP <80 MM HG: CPT | Performed by: FAMILY MEDICINE

## 2024-09-10 PROCEDURE — 99214 OFFICE O/P EST MOD 30 MIN: CPT | Performed by: FAMILY MEDICINE

## 2024-09-10 PROCEDURE — 1159F MED LIST DOCD IN RCRD: CPT | Performed by: FAMILY MEDICINE

## 2024-09-10 RX ORDER — WARFARIN 7.5 MG/1
7.5 TABLET ORAL 3 TIMES WEEKLY
Qty: 90 TABLET | Refills: 1 | Status: SHIPPED | OUTPATIENT
Start: 2024-09-11

## 2024-09-10 RX ORDER — DIGOXIN 125 MCG
125 TABLET ORAL DAILY
Qty: 90 TABLET | Refills: 0 | Status: SHIPPED | OUTPATIENT
Start: 2024-09-10

## 2024-09-10 RX ORDER — ATORVASTATIN CALCIUM 40 MG/1
40 TABLET, FILM COATED ORAL DAILY
Qty: 90 TABLET | Refills: 0 | Status: SHIPPED | OUTPATIENT
Start: 2024-09-10

## 2024-09-10 RX ORDER — FLUOXETINE HYDROCHLORIDE 40 MG/1
40 CAPSULE ORAL 2 TIMES DAILY
Qty: 180 CAPSULE | Refills: 0 | Status: SHIPPED | OUTPATIENT
Start: 2024-09-10

## 2024-09-10 RX ORDER — ESOMEPRAZOLE MAGNESIUM 40 MG/1
40 CAPSULE, DELAYED RELEASE ORAL DAILY
Qty: 90 CAPSULE | Refills: 0 | Status: SHIPPED | OUTPATIENT
Start: 2024-09-10

## 2024-09-10 RX ORDER — METOPROLOL SUCCINATE 50 MG/1
50 TABLET, EXTENDED RELEASE ORAL
Qty: 90 TABLET | Refills: 1 | Status: SHIPPED | OUTPATIENT
Start: 2024-09-10

## 2024-09-10 RX ORDER — CYCLOBENZAPRINE HCL 10 MG
10 TABLET ORAL NIGHTLY PRN
Qty: 90 TABLET | Refills: 3 | Status: SHIPPED | OUTPATIENT
Start: 2024-09-10 | End: 2025-09-05

## 2024-09-10 RX ORDER — WARFARIN SODIUM 5 MG/1
TABLET ORAL
Qty: 51 TABLET | Refills: 0 | Status: SHIPPED | OUTPATIENT
Start: 2024-09-10

## 2024-09-10 ASSESSMENT — ENCOUNTER SYMPTOMS
OCCASIONAL FEELINGS OF UNSTEADINESS: 0
DEPRESSION: 0
LOSS OF SENSATION IN FEET: 0

## 2024-09-10 NOTE — PROGRESS NOTES
Subjective   Patient ID: Regina Tam is a 78 y.o. female who presents for Follow-up.    HPI   Pt is doing good  Mood is stable Pt denies cp, sob, edema  Dizziness  Goes to coumadin clinic  Needs flu vaccine  Gerd is stable  Review of Systems   All other systems reviewed and are negative.      Objective   BP (!) 141/118   Pulse 61   Wt 91.4 kg (201 lb 9.6 oz)   SpO2 94%   BMI 32.54 kg/m²     Physical Exam  Constitutional:       Appearance: Normal appearance.   HENT:      Head: Normocephalic and atraumatic.      Right Ear: Tympanic membrane, ear canal and external ear normal.      Left Ear: Tympanic membrane, ear canal and external ear normal.      Nose: Nose normal.      Mouth/Throat:      Mouth: Mucous membranes are moist.      Pharynx: Oropharynx is clear.   Eyes:      Extraocular Movements: Extraocular movements intact.      Conjunctiva/sclera: Conjunctivae normal.      Pupils: Pupils are equal, round, and reactive to light.   Cardiovascular:      Rate and Rhythm: Normal rate and regular rhythm.      Pulses: Normal pulses.      Heart sounds: Normal heart sounds.   Pulmonary:      Effort: Pulmonary effort is normal.      Breath sounds: Normal breath sounds.   Abdominal:      General: Abdomen is flat. Bowel sounds are normal.      Palpations: Abdomen is soft.   Musculoskeletal:         General: Normal range of motion.      Cervical back: Normal range of motion and neck supple.   Skin:     General: Skin is warm and dry.      Capillary Refill: Capillary refill takes less than 2 seconds.   Neurological:      General: No focal deficit present.      Mental Status: She is alert and oriented to person, place, and time.   Psychiatric:         Mood and Affect: Mood normal.         Behavior: Behavior normal.         Thought Content: Thought content normal.         Assessment/Plan   Diagnoses and all orders for this visit:  Elevated lipids  -     atorvastatin (Lipitor) 40 mg tablet; Take 1 tablet (40 mg) by mouth  once daily.  -     Comprehensive Metabolic Panel; Future  -     Lipid Panel; Future  Arthritis of left knee  -     cyclobenzaprine (Flexeril) 10 mg tablet; Take 1 tablet (10 mg) by mouth as needed at bedtime for muscle spasms.  Atrial fibrillation, unspecified type (Multi)  -     digoxin (Lanoxin) 125 MCG tablet; Take 1 tablet (125 mcg) by mouth once daily.  -     metoprolol succinate XL (Toprol-XL) 50 mg 24 hr tablet; Take 1 tablet (50 mg) by mouth once daily.  -     Disability Placard  Depression, major, single episode, moderate (Multi)  -     FLUoxetine (PROzac) 40 mg capsule; Take 1 capsule (40 mg) by mouth 2 times a day.  Gastroesophageal reflux disease without esophagitis  -     esomeprazole (NexIUM) 40 mg DR capsule; Take 1 capsule (40 mg) by mouth once daily.  Paroxysmal atrial fibrillation (Multi)  -     warfarin (Coumadin) 5 mg tablet; Take as directed per After Visit Summary.  -     warfarin (Coumadin) 7.5 mg tablet; Take 1 tablet (7.5 mg) by mouth 3 times a week.  Other orders  -     Flu vaccine, trivalent, preservative free, HIGH-DOSE, age 65y+ (Fluzone)

## 2024-09-12 ENCOUNTER — ANTICOAGULATION - WARFARIN VISIT (OUTPATIENT)
Dept: CARDIOLOGY | Facility: CLINIC | Age: 79
End: 2024-09-12
Payer: MEDICARE

## 2024-09-12 DIAGNOSIS — I48.91 ATRIAL FIBRILLATION, UNSPECIFIED TYPE (MULTI): Primary | ICD-10-CM

## 2024-09-12 LAB
POC INR: 2.6
POC PROTHROMBIN TIME: NORMAL

## 2024-09-12 PROCEDURE — 85610 PROTHROMBIN TIME: CPT | Mod: QW

## 2024-09-12 PROCEDURE — 99211 OFF/OP EST MAY X REQ PHY/QHP: CPT

## 2024-09-12 NOTE — PROGRESS NOTES
Patient identification verified with 2 identifiers.    Location: [facility] - [address] [place] [address] [phone]     Referring Physician: Dr Jillian Vizcarra  Enrollment/ Re-enrollment date: 2024   INR Goal: 2.0-3.0  INR monitoring is per [facility] protocol.  Anticoagulation Medication: warfarin  Indication: Atrial Fibrillation/Atrial Flutter    Subjective   Bleeding signs/symptoms: No    Bruising: No   Major bleeding event: No  Thrombosis signs/symptoms: No  Thromboembolic event: No  Missed doses: No  Extra doses: No  Medication changes: No  Dietary changes: No  Change in health: No  Change in activity: No  Alcohol: No  Other concerns: No    Upcoming Procedures:  Does the Patient Have any upcoming procedures that require interruption in anticoagulation therapy? no  Does the patient require bridging? no      Anticoagulation Summary  As of 2024      INR goal:  2.0-3.0   TTR:  81.2% (11.9 mo)   INR used for dosin.60 (2024)   Weekly warfarin total:  37.5 mg               Assessment/Plan   Therapeutic     1. New dose: no change    2. Next INR: 1 month      Education provided to patient during the visit:  Patient instructed to call in interim with questions, concerns and changes.   Patient educated on interactions between medications and warfarin.   Patient educated on dietary consistency in vitamin k consumption.   Patient educated on affects of alcohol consumption while taking warfarin.   Patient educated on signs of bleeding/clotting.   Patient educated on compliance with dosing, follow up appointments, and prescribed plan of care.

## 2024-10-10 ENCOUNTER — ANTICOAGULATION - WARFARIN VISIT (OUTPATIENT)
Dept: CARDIOLOGY | Facility: CLINIC | Age: 79
End: 2024-10-10
Payer: MEDICARE

## 2024-10-10 DIAGNOSIS — I48.91 ATRIAL FIBRILLATION, UNSPECIFIED TYPE (MULTI): Primary | ICD-10-CM

## 2024-10-10 LAB
POC INR: 3.3
POC PROTHROMBIN TIME: NORMAL

## 2024-10-10 PROCEDURE — 85610 PROTHROMBIN TIME: CPT | Mod: QW

## 2024-10-10 PROCEDURE — 99211 OFF/OP EST MAY X REQ PHY/QHP: CPT

## 2024-10-10 NOTE — PROGRESS NOTES
Patient identification verified with 2 identifiers.    Location: [facility] - [address] [place] [address] [phone]     Referring Physician: Dr Jillian Vizcarra  Enrollment/ Re-enrollment date: 11/17/2024   INR Goal: 2.0-3.0  INR monitoring is per [facility] protocol.  Anticoagulation Medication: warfarin  Indication: Atrial Fibrillation/Atrial Flutter    Subjective   Bleeding signs/symptoms: No    Bruising: No   Major bleeding event: No  Thrombosis signs/symptoms: No  Thromboembolic event: No  Missed doses: No  Extra doses: No  Medication changes: No  Dietary changes: Yes  Change in health: No  Change in activity: No  Alcohol: No  Other concerns: No    Upcoming Procedures:  Does the Patient Have any upcoming procedures that require interruption in anticoagulation therapy? no  Does the patient require bridging? no      Anticoagulation Summary  As of 10/10/2024      INR goal:  2.0-3.0   TTR:  79.5% (1.1 y)   INR used for dosing:  3.30 (10/10/2024)   Weekly warfarin total:  37.5 mg               Assessment/Plan   supraTherapeutic     1. New dose: no change    2. Next INR: 1 week      Education provided to patient during the visit:  Patient instructed to call in interim with questions, concerns and changes.   Patient educated on interactions between medications and warfarin.   Patient educated on dietary consistency in vitamin k consumption.   Patient educated on affects of alcohol consumption while taking warfarin.   Patient educated on signs of bleeding/clotting.   Patient educated on compliance with dosing, follow up appointments, and prescribed plan of care.

## 2024-10-17 ENCOUNTER — APPOINTMENT (OUTPATIENT)
Dept: CARDIOLOGY | Facility: CLINIC | Age: 79
End: 2024-10-17
Payer: MEDICARE

## 2024-10-17 DIAGNOSIS — I48.91 ATRIAL FIBRILLATION, UNSPECIFIED TYPE (MULTI): Primary | ICD-10-CM

## 2024-10-17 LAB
POC INR: 2.7
POC PROTHROMBIN TIME: NORMAL

## 2024-10-17 PROCEDURE — 99211 OFF/OP EST MAY X REQ PHY/QHP: CPT

## 2024-10-17 PROCEDURE — 85610 PROTHROMBIN TIME: CPT | Mod: QW

## 2024-10-17 NOTE — PROGRESS NOTES
Patient identification verified with 2 identifiers.    Location: [facility] - [address] [place] [address] [phone]     Referring Physician: Dr Jillian Vizcarra  Enrollment/ Re-enrollment date: 2024   INR Goal: 2.0-3.0  INR monitoring is per [facility] protocol.  Anticoagulation Medication: warfarin  Indication: Atrial Fibrillation/Atrial Flutter    Subjective   Bleeding signs/symptoms: No    Bruising: No   Major bleeding event: No  Thrombosis signs/symptoms: No  Thromboembolic event: No  Missed doses: No  Extra doses: No  Medication changes: No  Dietary changes: No  Change in health: No  Change in activity: No  Alcohol: No  Other concerns: No    Upcoming Procedures:  Does the Patient Have any upcoming procedures that require interruption in anticoagulation therapy? no  Does the patient require bridging? no      Anticoagulation Summary  As of 10/17/2024      INR goal:  2.0-3.0   TTR:  78.9% (1.1 y)   INR used for dosin.70 (10/17/2024)   Weekly warfarin total:  37.5 mg               Assessment/Plan   Therapeutic     1. New dose: Will maintain dose and patient will return in 4 weeks.    2. Next INR: 1 month      Education provided to patient during the visit:  Patient instructed to call in interim with questions, concerns and changes.

## 2024-11-04 ENCOUNTER — TELEPHONE (OUTPATIENT)
Dept: PRIMARY CARE | Facility: CLINIC | Age: 79
End: 2024-11-04
Payer: MEDICARE

## 2024-11-04 NOTE — TELEPHONE ENCOUNTER
PT CALLED STATING SHE'S HAD DIARRHEA SINCE SATURDAY, SHE'S TAKEN IMODIUM BUT ITS JUST STRAIT WATER AT THIS POINT. IS THERE ANYTHING SHE CAN TAKE TO HELP?

## 2024-11-14 ENCOUNTER — ANTICOAGULATION - WARFARIN VISIT (OUTPATIENT)
Dept: CARDIOLOGY | Facility: CLINIC | Age: 79
End: 2024-11-14
Payer: MEDICARE

## 2024-11-14 DIAGNOSIS — I48.91 ATRIAL FIBRILLATION, UNSPECIFIED TYPE (MULTI): Primary | ICD-10-CM

## 2024-11-14 LAB
POC INR: 5.8
POC PROTHROMBIN TIME: NORMAL

## 2024-11-14 PROCEDURE — 85610 PROTHROMBIN TIME: CPT | Mod: QW

## 2024-11-14 NOTE — PROGRESS NOTES
Patient identification verified with 2 identifiers.    Location: [facility] - [address]. [place] [id number] [phone]     Referring Physician: Dr Jillian Vizcarra  Enrollment/ Re-enrollment date: 2024   INR Goal: 2.0-3.0  INR monitoring is per [facility] protocol.  Anticoagulation Medication: warfarin  Indication: Atrial Fibrillation/Atrial Flutter    Subjective   Bleeding signs/symptoms: No    Bruising: No   Major bleeding event: No  Thrombosis signs/symptoms: No  Thromboembolic event: No  Missed doses: No  Extra doses: No  Medication changes: No  Dietary changes: No  Change in health: No  Change in activity: No  Alcohol: No  Other concerns: No    Upcoming Procedures:  Does the Patient Have any upcoming procedures that require interruption in anticoagulation therapy? no  Does the patient require bridging? no      Anticoagulation Summary  As of 2024      INR goal:  2.0-3.0   TTR:  74.3% (1.2 y)   INR used for dosin.80 (2024)   Weekly warfarin total:  37.5 mg               Assessment/Plan   Supratherapeutic    1. New dose: Spoke to Dr. Vizcarra directly who ordered a 2 day dose hold with the patient returning on Monday, .    2. Next INR:  4 days      Education provided to patient during the visit:  Patient instructed to call in interim with questions, concerns and changes.

## 2024-11-18 ENCOUNTER — ANTICOAGULATION - WARFARIN VISIT (OUTPATIENT)
Dept: CARDIOLOGY | Facility: CLINIC | Age: 79
End: 2024-11-18
Payer: MEDICARE

## 2024-11-18 DIAGNOSIS — I48.91 ATRIAL FIBRILLATION, UNSPECIFIED TYPE (MULTI): Primary | ICD-10-CM

## 2024-11-18 LAB
POC INR: 2.3
POC PROTHROMBIN TIME: NORMAL

## 2024-11-18 PROCEDURE — 99211 OFF/OP EST MAY X REQ PHY/QHP: CPT

## 2024-11-18 PROCEDURE — 85610 PROTHROMBIN TIME: CPT | Mod: QW

## 2024-11-18 NOTE — PROGRESS NOTES
Patient identification verified with 2 identifiers.    Location: [facility] - [address] [address] [phone]     Referring Physician: Dr Jillian Vizcarra  Enrollment/ Re-enrollment date: 2024   INR Goal: 2.0-3.0  INR monitoring is per [facility] protocol.  Anticoagulation Medication: warfarin  Indication: Atrial Fibrillation/Atrial Flutter      Subjective   Bleeding signs/symptoms: No    Bruising: No   Major bleeding event: No  Thrombosis signs/symptoms: No  Thromboembolic event: No  Missed doses: No  Extra doses: No  Medication changes: No  Dietary changes: No  Change in health: No  Change in activity: No  Alcohol: No  Other concerns: No    Upcoming Procedures:  Does the Patient Have any upcoming procedures that require interruption in anticoagulation therapy? no  Does the patient require bridging? no      Anticoagulation Summary  As of 2024      INR goal:  2.0-3.0   TTR:  73.8% (1.2 y)   INR used for dosin.30 (2024)   Weekly warfarin total:  37.5 mg               Assessment/Plan   Therapeutic     1. New dose: Will maintain dose and patient will return in 4 weeks.    2. Next INR: 1 month      Education provided to patient during the visit:  Patient instructed to call in interim with questions, concerns and changes.

## 2024-12-16 ENCOUNTER — ANTICOAGULATION - WARFARIN VISIT (OUTPATIENT)
Dept: CARDIOLOGY | Facility: CLINIC | Age: 79
End: 2024-12-16
Payer: MEDICARE

## 2024-12-16 DIAGNOSIS — I48.91 ATRIAL FIBRILLATION, UNSPECIFIED TYPE (MULTI): Primary | ICD-10-CM

## 2024-12-16 LAB
POC INR: 2.7
POC PROTHROMBIN TIME: NORMAL

## 2024-12-16 PROCEDURE — 85610 PROTHROMBIN TIME: CPT | Mod: QW

## 2024-12-16 PROCEDURE — 99211 OFF/OP EST MAY X REQ PHY/QHP: CPT

## 2024-12-16 NOTE — PROGRESS NOTES
Patient identification verified with 2 identifiers.    Location: [facility] - [address] [place] [address] [phone]     Referring Physician: Dr Jillian Vizcarra  Enrollment/ Re-enrollment date: 2024   INR Goal: 2.0-3.0  INR monitoring is per [facility] protocol.  Anticoagulation Medication: warfarin  Indication: Atrial Fibrillation/Atrial Flutter    Subjective   Bleeding signs/symptoms: No    Bruising: No   Major bleeding event: No  Thrombosis signs/symptoms: No  Thromboembolic event: No  Missed doses: No  Extra doses: No  Medication changes: No  Dietary changes: No  Change in health: No  Change in activity: No  Alcohol: No  Other concerns: No    Upcoming Procedures:  Does the Patient Have any upcoming procedures that require interruption in anticoagulation therapy? no  Does the patient require bridging? no      Anticoagulation Summary  As of 2024      INR goal:  2.0-3.0   TTR:  75.4% (1.2 y)   INR used for dosin.70 (2024)   Weekly warfarin total:  37.5 mg               Assessment/Plan   Therapeutic     1. New dose: Will maintain dose and patient will return in 4 weeks.    2. Next INR: 1 month      Education provided to patient during the visit:  Patient instructed to call in interim with questions, concerns and changes.

## 2025-01-13 ENCOUNTER — ANTICOAGULATION - WARFARIN VISIT (OUTPATIENT)
Dept: CARDIOLOGY | Facility: CLINIC | Age: 80
End: 2025-01-13
Payer: MEDICARE

## 2025-01-13 DIAGNOSIS — I48.91 ATRIAL FIBRILLATION, UNSPECIFIED TYPE (MULTI): Primary | ICD-10-CM

## 2025-01-13 LAB
POC INR: 3.1
POC PROTHROMBIN TIME: NORMAL

## 2025-01-13 PROCEDURE — 85610 PROTHROMBIN TIME: CPT | Mod: QW

## 2025-01-13 PROCEDURE — 99211 OFF/OP EST MAY X REQ PHY/QHP: CPT

## 2025-01-13 NOTE — PROGRESS NOTES
Patient identification verified with 2 identifiers.    Location: [facility] - [address] [place] [address] [phone]     Referring Physician: Dr Jillian Vizcarra  Enrollment/ Re-enrollment date: 12/16/2025  INR Goal: 2.0-3.0  INR monitoring is per [facility] protocol.  Anticoagulation Medication: warfarin  Indication: Atrial Fibrillation/Atrial Flutter    Subjective   Bleeding signs/symptoms: No    Bruising: No   Major bleeding event: No  Thrombosis signs/symptoms: No  Thromboembolic event: No  Missed doses: Yes  Extra doses: No  Denies  Medication changes: No  Dietary changes: No  Pt reports her diet has not been consistent the past week.  Change in health: No  Change in activity: No  Alcohol: No  Other concerns: No    Upcoming Procedures:  Does the Patient Have any upcoming procedures that require interruption in anticoagulation therapy? no  Does the patient require bridging? no      Anticoagulation Summary  As of 1/13/2025      INR goal:  2.0-3.0   TTR:  75.4% (1.3 y)   INR used for dosing:  3.10 (1/13/2025)   Weekly warfarin total:  37.5 mg               Assessment/Plan   Therapeutic     1. New dose: Will maintain dose and patient will return in 1 week.    2. Next INR: 1 week      Education provided to patient during the visit:  Patient instructed to call in interim with questions, concerns and changes.

## 2025-01-20 ENCOUNTER — APPOINTMENT (OUTPATIENT)
Dept: CARDIOLOGY | Facility: CLINIC | Age: 80
End: 2025-01-20
Payer: MEDICARE

## 2025-01-21 ENCOUNTER — OFFICE VISIT (OUTPATIENT)
Dept: ORTHOPEDIC SURGERY | Facility: CLINIC | Age: 80
End: 2025-01-21
Payer: MEDICARE

## 2025-01-21 VITALS — HEIGHT: 66 IN | WEIGHT: 201 LBS | BODY MASS INDEX: 32.3 KG/M2

## 2025-01-21 DIAGNOSIS — M75.82 ROTATOR CUFF TENDINITIS, LEFT: Primary | ICD-10-CM

## 2025-01-21 DIAGNOSIS — M25.512 ACUTE PAIN OF LEFT SHOULDER: ICD-10-CM

## 2025-01-21 PROCEDURE — 1036F TOBACCO NON-USER: CPT | Performed by: ORTHOPAEDIC SURGERY

## 2025-01-21 PROCEDURE — 1160F RVW MEDS BY RX/DR IN RCRD: CPT | Performed by: ORTHOPAEDIC SURGERY

## 2025-01-21 PROCEDURE — 1159F MED LIST DOCD IN RCRD: CPT | Performed by: ORTHOPAEDIC SURGERY

## 2025-01-21 PROCEDURE — 1123F ACP DISCUSS/DSCN MKR DOCD: CPT | Performed by: ORTHOPAEDIC SURGERY

## 2025-01-21 PROCEDURE — 99213 OFFICE O/P EST LOW 20 MIN: CPT | Performed by: ORTHOPAEDIC SURGERY

## 2025-01-21 PROCEDURE — 99213 OFFICE O/P EST LOW 20 MIN: CPT | Mod: 25 | Performed by: ORTHOPAEDIC SURGERY

## 2025-01-23 PROCEDURE — 20610 DRAIN/INJ JOINT/BURSA W/O US: CPT | Mod: LT | Performed by: ORTHOPAEDIC SURGERY

## 2025-01-23 RX ORDER — TRIAMCINOLONE ACETONIDE 40 MG/ML
40 INJECTION, SUSPENSION INTRA-ARTICULAR; INTRAMUSCULAR
Status: COMPLETED | OUTPATIENT
Start: 2025-01-23 | End: 2025-01-23

## 2025-01-23 RX ORDER — LIDOCAINE HYDROCHLORIDE 10 MG/ML
2 INJECTION, SOLUTION INFILTRATION; PERINEURAL
Status: COMPLETED | OUTPATIENT
Start: 2025-01-23 | End: 2025-01-23

## 2025-01-23 RX ADMIN — LIDOCAINE HYDROCHLORIDE 2 ML: 10 INJECTION, SOLUTION INFILTRATION; PERINEURAL at 14:39

## 2025-01-23 RX ADMIN — TRIAMCINOLONE ACETONIDE 40 MG: 40 INJECTION, SUSPENSION INTRA-ARTICULAR; INTRAMUSCULAR at 14:39

## 2025-01-23 NOTE — PROGRESS NOTES
79-year-old is seen with left shoulder pain.  She has been having persistent moderate throbbing pain in the left shoulder.  Pain is worse with overhead reaching and lifting activities.  She had a cortisone injection in July that helped her.    Pleasant and no acute distress.  Left shoulder forward flexion 160°. No effusion or instability. There is positive Neer and Hess impingement. There is subacromial crepitus and tenderness around the subacromial space. No biceps tenderness. No acromioclavicular tenderness. Rotator cuff strength is intact but there is discomfort with strength testing. Right shoulder forward flexion 180°. No effusion or instability. No impingement or crepitus or tenderness involving the subacromial space. No biceps or acromioclavicular tenderness. There is intact rotator cuff strength. There is adequate range of motion of the cervical spine without pain. Both upper extremities are well perfused skin is intact and muscle tone is adequate. Elbow flexion and extension and wrist flexion and extension strength are intact.    A discussion about rotator cuff problems was done.  Treatment options reviewed and the decision was made proceed with cortisone injection.  She will ice or heat and use Tylenol and avoid aggravating activities and perform an exercise program.  Follow-up based on her symptoms.    L Inj/Asp: L subacromial bursa on 1/23/2025 2:39 PM  Indications: pain  Details: 22 G needle, posterior approach  Medications: 40 mg triamcinolone acetonide 40 mg/mL; 2 mL lidocaine 10 mg/mL (1 %)  Procedure, treatment alternatives, risks and benefits explained, specific risks discussed. Consent was given by the patient.          Mirvaso Pregnancy And Lactation Text: This medication has not been assigned a Pregnancy Risk Category. It is unknown if the medication is excreted in breast milk.

## 2025-01-27 ENCOUNTER — ANTICOAGULATION - WARFARIN VISIT (OUTPATIENT)
Dept: CARDIOLOGY | Facility: CLINIC | Age: 80
End: 2025-01-27
Payer: MEDICARE

## 2025-01-27 ENCOUNTER — APPOINTMENT (OUTPATIENT)
Dept: CARDIOLOGY | Facility: CLINIC | Age: 80
End: 2025-01-27
Payer: MEDICARE

## 2025-01-27 DIAGNOSIS — I48.91 ATRIAL FIBRILLATION, UNSPECIFIED TYPE (MULTI): Primary | ICD-10-CM

## 2025-01-27 LAB
POC INR: 2.9
POC PROTHROMBIN TIME: NORMAL

## 2025-01-27 PROCEDURE — 85610 PROTHROMBIN TIME: CPT | Mod: QW

## 2025-01-27 PROCEDURE — 99211 OFF/OP EST MAY X REQ PHY/QHP: CPT

## 2025-01-27 NOTE — PROGRESS NOTES
Patient identification verified with 2 identifiers.    Location: [facility] - [address] [place] [address] [phone]     Referring Physician: Dr Jillian Vizcarra  Enrollment/ Re-enrollment date: 2025  INR Goal: 2.0-3.0  INR monitoring is per [facility] protocol.  Anticoagulation Medication: warfarin  Indication: Atrial Fibrillation/Atrial Flutter    Subjective   Bleeding signs/symptoms: No    Bruising: No   Major bleeding event: No  Thrombosis signs/symptoms: No  Thromboembolic event: No  Missed doses: No  Extra doses: No  Medication changes: No  Dietary changes: No  Change in health: No  Change in activity: No  Alcohol: No  Other concerns: No    Upcoming Procedures:  Does the Patient Have any upcoming procedures that require interruption in anticoagulation therapy? no  Does the patient require bridging? no      Anticoagulation Summary  As of 2025      INR goal:  2.0-3.0   TTR:  74.7% (1.4 y)   INR used for dosin.90 (2025)   Weekly warfarin total:  37.5 mg               Assessment/Plan   Therapeutic     1. New dose: Will maintain dose and patient will return in 4 weeks.    2. Next INR: 4 weeks      Education provided to patient during the visit:  Patient instructed to call in interim with questions, concerns and changes.

## 2025-02-27 ENCOUNTER — ANTICOAGULATION - WARFARIN VISIT (OUTPATIENT)
Dept: CARDIOLOGY | Facility: CLINIC | Age: 80
End: 2025-02-27
Payer: MEDICARE

## 2025-02-27 DIAGNOSIS — I48.91 ATRIAL FIBRILLATION, UNSPECIFIED TYPE (MULTI): Primary | ICD-10-CM

## 2025-02-27 LAB
POC INR: 2.5
POC PROTHROMBIN TIME: NORMAL

## 2025-02-27 PROCEDURE — 85610 PROTHROMBIN TIME: CPT | Mod: QW

## 2025-02-27 NOTE — PROGRESS NOTES
Patient identification verified with 2 identifiers.    Location: [facility] - [address] [place] [address] [phone]     Referring Physician: Dr Jillian Vizcarra  Enrollment/ Re-enrollment date: 2025  INR Goal: 2.0-3.0  INR monitoring is per [facility] protocol.  Anticoagulation Medication: warfarin  Indication: Atrial Fibrillation/Atrial Flutter    Subjective   Bleeding signs/symptoms: No    Bruising: No   Major bleeding event: No  Thrombosis signs/symptoms: No  Thromboembolic event: No  Missed doses: No  Extra doses: No  Medication changes: No  Dietary changes: No  Change in health: No  Change in activity: No  Alcohol: No  Other concerns: No    Upcoming Procedures:  Does the Patient Have any upcoming procedures that require interruption in anticoagulation therapy? no  Does the patient require bridging? no      Anticoagulation Summary  As of 2025      INR goal:  2.0-3.0   TTR:  76.2% (1.4 y)   INR used for dosin.50 (2025)   Weekly warfarin total:  37.5 mg               Assessment/Plan   Therapeutic     1. New dose: Will maintain dose and patient will return in 4 weeks.    2. Next INR: 1 month      Education provided to patient during the visit:  Patient instructed to call in interim with questions, concerns and changes.

## 2025-03-06 ENCOUNTER — EVALUATION (OUTPATIENT)
Dept: PHYSICAL THERAPY | Facility: CLINIC | Age: 80
End: 2025-03-06
Payer: MEDICARE

## 2025-03-06 DIAGNOSIS — Z96.651 PRESENCE OF RIGHT ARTIFICIAL KNEE JOINT: ICD-10-CM

## 2025-03-06 DIAGNOSIS — S80.01XA CONTUSION OF RIGHT KNEE, INITIAL ENCOUNTER: ICD-10-CM

## 2025-03-06 PROCEDURE — 97112 NEUROMUSCULAR REEDUCATION: CPT | Mod: GP | Performed by: PHYSICAL THERAPIST

## 2025-03-06 PROCEDURE — 97161 PT EVAL LOW COMPLEX 20 MIN: CPT | Mod: GP | Performed by: PHYSICAL THERAPIST

## 2025-03-06 ASSESSMENT — COLUMBIA-SUICIDE SEVERITY RATING SCALE - C-SSRS
6. HAVE YOU EVER DONE ANYTHING, STARTED TO DO ANYTHING, OR PREPARED TO DO ANYTHING TO END YOUR LIFE?: NO
1. IN THE PAST MONTH, HAVE YOU WISHED YOU WERE DEAD OR WISHED YOU COULD GO TO SLEEP AND NOT WAKE UP?: NO
2. HAVE YOU ACTUALLY HAD ANY THOUGHTS OF KILLING YOURSELF?: NO

## 2025-03-06 ASSESSMENT — ENCOUNTER SYMPTOMS
OCCASIONAL FEELINGS OF UNSTEADINESS: 0
LOSS OF SENSATION IN FEET: 0
DEPRESSION: 0

## 2025-03-06 NOTE — PROGRESS NOTES
Patient Name Regina Tam  MRN: [id number]  Today's Date: 3/6/2025  Time Calculation  Start Time: 0315  Stop Time: 0400  Time Calculation (min): 45 min    Insurance:   Visit #: 1  Insurance Reviewed  (per information provided by  pre-cert team)   [facility]  Authorization required:  Y  [facility] certification date range:  3-6-25/6-4-25    Therapy Diagnoses:   Problem List Items Addressed This Visit             ICD-10-CM    Contusion of right knee S80.01XA    Presence of right artificial knee joint Z96.651     General:  Reason for visit: R knee contusion   Referred by: Nicko Nice MD Next MD appt:  damián  Preferred Name:  Regina  Script:  PT 1-2 x/week for 6 weeks/cert for 6 weeks  Onset Date:  11-15-25  Assessment/re-assessment dates: 3-6-25  Email/phone #:  [email or web address]  Access Code: [email or web address]    Subjective:  HPI:  Patient has a history of R TKR with revision in 9-23.  Patient was doing well and then fell twice in late 2024 and suffered and contusion to the R knee and continues to have difficulty with getting out of chairs and feels off balance.  Patient had X-rays that were - and patient is referred to outpatient PT.     Pain:  0/10    Medical Hx/  Fall Risk:  low  Steadi:  4  yes  Precautions: A-fib, s/p R RTC repair, L RTC tendinitis, LS pain,  high cholesterol, see meds in chart    Human Trafficking risk:  No    Patient goal:  Decreased pain and increased function.   Patient is aware of diagnosis and prognosis.    Living Environment:  ranch with basement, lift chair to basement, laundry on first floor, 2 access steps with rail  Social Support:  lives with:  rail, daughter and son-in-law  DME:   lift chair to basement, grab bars, high toilet seat, shower bench      Prior Function:   same level of function for the last year, but less falls one year ago.     Function:    A and O x 3  Sleep: WFL, some L shoulder pain at times when shots wear off,  instructed in proper postures.  ADL's:  a little off balance with  "dressing, leans to dress  Chores:  does her own laundry, has a clean lady.    Driving:  some difficulty with transfers at times.    Work: retired  Recreation: Y member, wants to resume machine program.    Objective:    Outcome Measures:  Other Measures  Lower Extremity Funtional Score (LEFS): 40 /38% limitation of function    Posture:  decreased WB LE,  R LE slightly ER at hip/R genu valgus.      Gait/stairs:  decreased stance time, decreased hip ext and trunk rotation, LE ER throughout cycle and step to pattern on stairs with B UE support with L LE WB.      Balance:   R SLS:  9\"    5 x sit-stand:  B UE on arm rests 24.49\"  normals:  60-69 years of age: 11.4 seconds  70 - 79 years of age: 12.6 seconds **  80 - 89 years of age: 14. 8 seconds    ROM:  R knee ext/flex:  A: 0/120    MMT:   Hip flexors: 4-/5  ext: (tested side lying):  4-/5  abd: 4-/5  add: 4-/5  Quads: 4/5  Hams: (tested side lying):  4-/5  DF:  4/5  PF: (NWB):  4/5    Flexibility:    Hams:  90/90: -23  Heelcords: 0  Hip flexors:  mod    Palpation:  - pop    Treatment:    Evaluation:  30 minutes    **= HEP progression today NV= Next visit  np= not performed  nb= non-billable  G= group  HEP= discharged to [facility].     Therapeutic Exercise:  5  Nu-step(subjective taken/education):    NV  R hip flexor/calf and stretch:  3/30\" ea.    LAQ:  NV  seated hams curl with t-band: NV    SAQ:  NV    NMR:  5  Marching/alt hip abd/ext:  x 10 ea.  R SLS:  NV  R T-band TKE:  NV  Partial squats:  NV    R knee ROM:  A: 0/120    Modalities:    prn    Self Care Home Management:    minutes  Education:  poc, anatomy, physiology, posture, body mechanics, safety awareness, HEP(see below).  Preferred learning:  pictures, demonstration.  Demonstrated good understanding.                     Assessment:    Evolving with changing characteristics  Level of complexity:  low  Patient presents with flare up of R knee after fall/contusion, , signs and symptoms are consistent with diagnosis " "and patient is an excellent candidate for Physical Therapy and needs work on flexibility, posture, closed chain, strength, balance, gait and stairs for improved overall function.    Problems:    Pain:  __x_  Posture/Body mechanics deficits:  _x__  Decreased knowledge HEP:  _x__  Decreased knowledge of Precautions:  ___  Activity Limitations:   __x_  ADL's/IADL's/Self-care skills:  _x__  ROM/Joint Mobility:  ___  Strength:  _x__  Decreased functional level:   __x_  Flexibility:  __x_  Tenderness/decreased soft tissue mobility:  ___  Gait/Stairs:  __x_  Fall Risk:  _x__  Balance:  _x__  Edema:  ___  Participation restrictions:  ___  Sensory:  ___  Transfers:  ___  Decreased knowledge of brace:   ___  Other:  ___    X Indicates included in problem list    Goals:    STG:  In two weeks.   -Increased postural awareness.   -Compliant with HEP  LTG: by discharge  -Increased postural awareness and posture WFL.  -Increased function with ADL's and IADL's.  Improve LEFS to:  20 %  limitation of function.  -Normal gait pattern  on level and uneven surfaces community level distances for improved function in the community.   -Normal reciprocal pattern on stairs with one rail for improved function to all levels of home.    -Increase  R SLS to 15\"  -Decrease TUG to within norms for age.   -Increase R LE strength to WNL for improved function with chores.  -Increase R LE flexibility to WFL.    -I and compliant with HEP and proper: R LE care and I with proper gym program.      Rehab potential to achieve the above goals is good.    Patient is aware of diagnosis and prognosis and agrees with established plan of care and goals.    Plan:   Skilled PT 2 x/week for 12 weeks( Until goals achieved, maximum rehab potential has been achieved, or patient has plateaued)  for:    Aquatics  __x___prn  CP   __x__  Vasopneumatic device ____  Dry needling  ____  Education  __x__  Electrical Stimulation  __x__  Gait training  __x__  HEP  __x__  Manual  " __x__  Mechanical Traction  ____  NMR  __x__  Self-care/home management  __x__  Therapeutic Exercise   _x___  Therapeutic Activities  __x__    ___x_  Work Conditioning  ____  Re-assessment  _x___  Other  ____    X Indicates included in treatment plan    PT for Nu-step for functional mobility and soft tissue warm up for more efficient stretching, work on flexibility, posture, closed chain, strength, balance, gait and stairs for improved overall function.    HEP:      Access Code: [email or web address]  URL: [email or web address]  Date: 03/06/2025  Prepared by: Adriana Solitario    Exercises  - Standing Hamstring Stretch on Chair  - 1-2 x daily - 7 x weekly - 1 sets - 3 reps - 30  second hold  - Standing Hip Flexor Stretch with Chair  - 1 x daily - 7 x weekly - 1 sets - 3 reps - 30 second  hold  - Standing March with Counter Support  - 1 x daily - 7 x weekly - 3 sets - 10 reps - 2-3 seconds hold  - Standing Hip Abduction with Counter Support  - 1 x daily - 7 x weekly - 3 sets - 10 reps - 2-3 seconds hold  - Standing Hip Extension with Counter Support  - 1 x daily - 7 x weekly - 3 sets - 10 reps - 2-3 seconds hold

## 2025-03-06 NOTE — LETTER
March 6, 2025    [place]  [address] [facility]  [facility] [address]    Patient: Regina Tam   YOB: 1945   Date of Visit: 3/6/2025       Dear Nicko Nice MD  [address] [place] [address]    The attached plan of care is being sent to you because your patient’s medical reimbursement requires that you certify the plan of care. Your signature is required to allow uninterrupted insurance coverage.      You may indicate your approval by signing below and faxing this form back to us at Dept Fax: [phone].    Please call Dept: [phone] with any questions or concerns.    Thank you for this referral,        Adriana Solitario, PT  PAR [address]  [address] [facility] [address]    Payer: Payor: MEDICARE / Plan: MEDICARE PART A AND B / Product Type: *No Product type* /                                                                         Date:     Dear Adriana Solitario PT,     Re: Ms. Regina Tam, MRN:[id number]    I certify that I have reviewed the attached plan of care and it is medically necessary for Ms. Regina Tam (1945) who is under my care.          ______________________________________                    _________________  Provider name and credentials                                           Date and time                                                                                           Plan of Care 3/6/25   Effective from: 3/6/2025  Effective to: 6/4/2025    Plan ID: [id number]            Participants as of Finalize on 3/6/2025    Name Type Comments Contact Info    Nicko Nice MD Referring Provider  [phone]    Adriana Solitario PT Physical Therapist  [phone]       Last Plan Note     Author: Adriana Solitario PT Status: Incomplete Last edited: 3/6/2025  3:15 PM       Patient Name Regina Tam  MRN: [id number]  Today's Date: 3/6/2025  Time Calculation  Start  Time: 0315  Stop Time: 0400  Time Calculation (min): 45 min    Insurance:   Visit #: 1  Insurance Reviewed  (per information provided by  pre-cert team)   [facility]  Authorization required:  Y  [facility] certification date range:  3-6-25/6-4-25    Therapy Diagnoses:   Problem List Items Addressed This Visit             ICD-10-CM    Contusion of right knee S80.01XA    Presence of right artificial knee joint Z96.651     General:  Reason for visit: R knee contusion   Referred by: Nicko Nice MD Next MD appt:  damián  Preferred Name:  Regina  Script:  PT 1-2 x/week for 6 weeks/cert for 6 weeks  Onset Date:  11-15-25  Assessment/re-assessment dates: 3-6-25  Email/phone #:  [email or web address]  Access Code: [email or web address]    Subjective:  HPI:  Patient has a history of R TKR with revision in 9-23.  Patient was doing well and then fell twice in late 2024 and suffered and contusion to the R knee and continues to have difficulty with getting out of chairs and feels off balance.  Patient had X-rays that were - and patient is referred to outpatient PT.     Pain:  0/10    Medical Hx/  Fall Risk:  low  Steadi:  4  yes  Precautions: A-fib, s/p R RTC repair, L RTC tendinitis, LS pain,  high cholesterol, see meds in chart    Human Trafficking risk:  No    Patient goal:  Decreased pain and increased function.   Patient is aware of diagnosis and prognosis.    Living Environment:  ranch with basement, lift chair to basement, laundry on first floor, 2 access steps with rail  Social Support:  lives with:  rail, daughter and son-in-law  DME:   lift chair to basement, grab bars, high toilet seat, shower bench      Prior Function:   same level of function for the last year, but less falls one year ago.     Function:    A and O x 3  Sleep: WFL, some L shoulder pain at times when shots wear off,  instructed in proper postures.  ADL's:  a little off balance with dressing, leans to dress  Chores:  does her own laundry, has a clean lady.    Driving:  some  "difficulty with transfers at times.    Work: retired  Recreation: Y member, wants to resume machine program.    Objective:    Outcome Measures:  Other Measures  Lower Extremity Funtional Score (LEFS): 40 /38% limitation of function    Posture:  decreased WB LE,  R LE slightly ER at hip/R genu valgus.      Gait/stairs:  decreased stance time, decreased hip ext and trunk rotation, LE ER throughout cycle and step to pattern on stairs with B UE support with L LE WB.      Balance:   R SLS:  9\"    5 x sit-stand:  B UE on arm rests 24.49\"  normals:  60-69 years of age: 11.4 seconds  70 - 79 years of age: 12.6 seconds **  80 - 89 years of age: 14. 8 seconds    ROM:  R knee ext/flex:  A: 0/120    MMT:   Hip flexors: 4-/5  ext: (tested side lying):  4-/5  abd: 4-/5  add: 4-/5  Quads: 4/5  Hams: (tested side lying):  4-/5  DF:  4/5  PF: (NWB):  4/5    Flexibility:    Hams:  90/90: -23  Heelcords: 0  Hip flexors:  mod    Palpation:  - pop    Treatment:    Evaluation:  30 minutes    **= HEP progression today NV= Next visit  np= not performed  nb= non-billable  G= group  HEP= discharged to [facility].     Therapeutic Exercise:  5  Nu-step(subjective taken/education):    NV  R hip flexor/calf and stretch:  3/30\" ea.    LAQ:  NV  seated hams curl with t-band: NV    SAQ:  NV    NMR:  5  Marching/alt hip abd/ext:  x 10 ea.  R SLS:  NV  R T-band TKE:  NV  Partial squats:  NV    R knee ROM:  A: 0/120    Modalities:    prn    Self Care Home Management:    minutes  Education:  poc, anatomy, physiology, posture, body mechanics, safety awareness, HEP(see below).  Preferred learning:  pictures, demonstration.  Demonstrated good understanding.                     Assessment:    Evolving with changing characteristics  Level of complexity:  low  Patient presents with flare up of R knee after fall/contusion, , signs and symptoms are consistent with diagnosis and patient is an excellent candidate for Physical Therapy and needs work on flexibility, " "posture, closed chain, strength, balance, gait and stairs for improved overall function.    Problems:    Pain:  __x_  Posture/Body mechanics deficits:  _x__  Decreased knowledge HEP:  _x__  Decreased knowledge of Precautions:  ___  Activity Limitations:   __x_  ADL's/IADL's/Self-care skills:  _x__  ROM/Joint Mobility:  ___  Strength:  _x__  Decreased functional level:   __x_  Flexibility:  __x_  Tenderness/decreased soft tissue mobility:  ___  Gait/Stairs:  __x_  Fall Risk:  _x__  Balance:  _x__  Edema:  ___  Participation restrictions:  ___  Sensory:  ___  Transfers:  ___  Decreased knowledge of brace:   ___  Other:  ___    X Indicates included in problem list    Goals:    STG:  In two weeks.   -Increased postural awareness.   -Compliant with HEP  LTG: by discharge  -Increased postural awareness and posture WFL.  -Increased function with ADL's and IADL's.  Improve LEFS to:  20 %  limitation of function.  -Normal gait pattern  on level and uneven surfaces community level distances for improved function in the community.   -Normal reciprocal pattern on stairs with one rail for improved function to all levels of home.    -Increase  R SLS to 15\"  -Decrease TUG to within norms for age.   -Increase R LE strength to WNL for improved function with chores.  -Increase R LE flexibility to WFL.    -I and compliant with HEP and proper: R LE care and I with proper gym program.      Rehab potential to achieve the above goals is good.    Patient is aware of diagnosis and prognosis and agrees with established plan of care and goals.    Plan:   Skilled PT 2 x/week for 12 weeks( Until goals achieved, maximum rehab potential has been achieved, or patient has plateaued)  for:    Aquatics  __x___prn  CP   __x__  Vasopneumatic device ____  Dry needling  ____  Education  __x__  Electrical Stimulation  __x__  Gait training  __x__  HEP  __x__  Manual  __x__  Mechanical Traction  ____  NMR  __x__  Self-care/home management  " __x__  Therapeutic Exercise   _x___  Therapeutic Activities  __x__  US  ___x_  Work Conditioning  ____  Re-assessment  _x___  Other  ____    X Indicates included in treatment plan    PT for Nu-step for functional mobility and soft tissue warm up for more efficient stretching, work on flexibility, posture, closed chain, strength, balance, gait and stairs for improved overall function.    HEP:      Access Code: [email or web address]  URL: [email or web address]  Date: 03/06/2025  Prepared by: Adriana Solitario    Exercises  - Standing Hamstring Stretch on Chair  - 1-2 x daily - 7 x weekly - 1 sets - 3 reps - 30  second hold  - Standing Hip Flexor Stretch with Chair  - 1 x daily - 7 x weekly - 1 sets - 3 reps - 30 second  hold  - Standing March with Counter Support  - 1 x daily - 7 x weekly - 3 sets - 10 reps - 2-3 seconds hold  - Standing Hip Abduction with Counter Support  - 1 x daily - 7 x weekly - 3 sets - 10 reps - 2-3 seconds hold  - Standing Hip Extension with Counter Support  - 1 x daily - 7 x weekly - 3 sets - 10 reps - 2-3 seconds hold                              Current Participants as of 3/6/2025    Name Type Comments Contact Info    Nicko Nice MD Referring Provider  [phone]    Signature pending    Adriana Solitario PT Physical Therapist  [phone]    Signature pending

## 2025-03-12 ENCOUNTER — TREATMENT (OUTPATIENT)
Dept: PHYSICAL THERAPY | Facility: CLINIC | Age: 80
End: 2025-03-12
Payer: MEDICARE

## 2025-03-12 DIAGNOSIS — Z96.651 PRESENCE OF RIGHT ARTIFICIAL KNEE JOINT: ICD-10-CM

## 2025-03-12 DIAGNOSIS — S80.01XA CONTUSION OF RIGHT KNEE, INITIAL ENCOUNTER: ICD-10-CM

## 2025-03-12 PROCEDURE — 97110 THERAPEUTIC EXERCISES: CPT | Mod: GP,CQ

## 2025-03-12 PROCEDURE — 97112 NEUROMUSCULAR REEDUCATION: CPT | Mod: GP,CQ

## 2025-03-12 NOTE — PROGRESS NOTES
Physical Therapy  Physical Therapy Progress Note    Patient Name Regina Tam   MRN: [id number]  Today's Date: 3/12/2025  Time Calculation  Start Time: 0315  Stop Time: 0355  Time Calculation (min): 40 min    Insurance:    Visit #:   2   Insurance Reviewed  (per information provided by  pre-cert team)   [employer]  Authorization required:  Y  [employer] certification date range:  3-6-25/6-4-25     Therapy Diagnoses:   Problem List Items Addressed This Visit             ICD-10-CM    Contusion of right knee S80.01XA    Presence of right artificial knee joint Z96.651       General:  Reason for visit: R knee contusion   Referred by: Nicko Nice MD  Next MD appt:  damián  Preferred Name:  Regina  Script:  PT 1-2 x/week for 6 weeks/cert for 6 weeks  Onset Date:  11-15-25  Assessment/re-assessment dates: 3-6-25  Email/phone #:  [email or web address]  Access Code: [email or web address]  Subjective:   Patient reports:  her knee feels good, her back is sore from coughing from the cold she had, hurt her ribs and her back . She had started on the HEP until she got sick. Also bought an electric leg peddlar for home , using it 1x /day.    Have you fallen since last visit:  no    Precautions:    Medical Hx/  Fall Risk:  low  Steadi:  4  yes  Precautions: A-fib, s/p R RTC repair, L RTC tendinitis, LS pain,  high cholesterol, see meds in chart     Pain:  0/10  Location/Type of pain:  none    HEP compliance/understanding:  patient reports partial compliance with the instructed home exercises.    Objective:   Objective Measurements:    Function:   Patient reports there has not been a significant change in functional abilities.as she has been ill. Just starting to resume activities and exercise.  Posture:   Gait:  Balance:   ROM:    Strength:  Flexibility:      Treatment:   **= HEP progression today NV= Next visit  np= not performed  nb= non-billable  G= group HEP= discharged to HEP  Therapeutic Exercise:     25 minutes  Nu-step(subjective  "taken/education):    L3 8'  R hip flexor/calf and stretch:  3/30\" ea.     Kosta LAQ:  2# 2 x 10 **  seated hams curl with t-band:  GTB 20x **     SAQ:  NV    Manual Therapy:       minutes      Neuromuscular Re-education:    15 minutes  Marching/alt hip abd/ext:  2 x 10 ea. 1#  with verbal cues for upright posture   B  SLS alternating    10 sec 5x ** with light UE support   R T-band TKE:  NV  Partial squats:  with UE support 15x  with tactile cues for UE support **    Gait Training:            minutes      Modalities:       Vasopneumatic Device       minutes  Electrical Stimulation          minutes  Ultrasound            minutes  Iontophoresis                     minutes  Cold Pack            minutes  Mechanical Traction           minutes        Re-Evaluation:   minutes      Education:  exercise progression/educated in purchase of cuff weight system for home    Assessment:  Patient tolerated treatment well, did well with progression this date.  Updated HEP and issued GTB. Both LE's addressed today for work on balance .  Patient needs continued work on/skilled PT for: ROM/flexibility and strengthening to address remaining functional, objective and subjective deficits to allow them to return to prior /optimal level of function with ADLs.  Patient is progressing with goals: partial compliance with HEP  Skilled care:  exercise progression    Plan:    Continue to progress per poc:   NV add TKE    HEP:      Access Code: [email or web address]  URL: [email or web address]  Date: 03/12/2025  Prepared by: Marie    Program Notes  2# for long arc quadsuse counter for squats not walker    Exercises  - Standing Hamstring Stretch on Chair  - 1-2 x daily - 7 x weekly - 1 sets - 3 reps - 30  second hold  - Standing Hip Flexor Stretch with Chair  - 1 x daily - 7 x weekly - 1 sets - 3 reps - 30 second  hold  - Standing March with Counter Support  - 1 x daily - 7 x weekly - 3 sets - 10 reps - 2-3 seconds hold  - Standing Hip " Abduction with Counter Support  - 1 x daily - 7 x weekly - 3 sets - 10 reps - 2-3 seconds hold  - Standing Hip Extension with Counter Support  - 1 x daily - 7 x weekly - 3 sets - 10 reps - 2-3 seconds hold  - Mini Squats with Walker and Chair  - 1 x daily - 7 x weekly - 1-2 sets - 10 reps - 2-3 hold  - Seated Long Arc Quad with Ankle Weight  - 1 x daily - 7 x weekly - 2 sets - 10 reps - 2-3 hold  - Seated Hamstring Curl with Anchored Resistance  - 1 x daily - 7 x weekly - 2 sets - 10 reps - 2-3 hold

## 2025-03-20 ENCOUNTER — TREATMENT (OUTPATIENT)
Dept: PHYSICAL THERAPY | Facility: CLINIC | Age: 80
End: 2025-03-20
Payer: MEDICARE

## 2025-03-20 DIAGNOSIS — S80.01XA CONTUSION OF RIGHT KNEE, INITIAL ENCOUNTER: ICD-10-CM

## 2025-03-20 DIAGNOSIS — Z96.651 PRESENCE OF RIGHT ARTIFICIAL KNEE JOINT: ICD-10-CM

## 2025-03-20 PROCEDURE — 97112 NEUROMUSCULAR REEDUCATION: CPT | Mod: GP,CQ

## 2025-03-20 PROCEDURE — 97110 THERAPEUTIC EXERCISES: CPT | Mod: GP,CQ

## 2025-03-20 NOTE — PROGRESS NOTES
"  Physical Therapy  Physical Therapy Progress Note    Patient Name Regina Tam   MRN: [id number]  Today's Date: 3/20/2025  Time Calculation  Start Time: 0100  Stop Time: 0145  Time Calculation (min): 45 min    Insurance:    Visit #:  3   Insurance Reviewed  (per information provided by  pre-cert team)   [facility]  Authorization required:  Y  MCR certification date range:  3-6-25/6-4-25     Therapy Diagnoses:   1. Contusion of right knee, initial encounter  Follow Up In Physical Therapy      2. Presence of right artificial knee joint  Follow Up In Physical Therapy          General:  Reason for visit: R knee contusion   Referred by: Nicko Nice MD  Next MD appt:  damián  Preferred Name:  Regina  Script:  PT 1-2 x/week for 6 weeks/cert for 6 weeks  Onset Date:  11-15-25  Assessment/re-assessment dates: 3-6-25  Email/phone #:  [email or web address]  Access Code: [email or web address]    Subjective:   Patient reports:   the right knee is weaker but no pain today.    Have you fallen since last visit:  fell  at  [employer] [place]  but did not need medical , not sure how it happened.    PrePrecautions:    Medical Hx/  Fall Risk:  low  Steadi:  4  yes  Precautions: A-fib, s/p R RTC repair, L RTC tendinitis, LS pain,  high cholesterol, see meds in chart     Pain:  0/10  Location/Type of pain:  none      Objective:   Objective Measurements:    Fu     Posture: forward flexed     Strength: sit to stand no hands of chair with airex         Treatment:   **= HEP  NV= Next visit  np= not performed  nb= non-billable  G= group HEP= discharged to [facility]      Therapeutic Exercise:     20 minutes  Nu-step(subjective taken/education):    L3 8'  R hip flexor/calf and stretch:  3/30\" ea.     Kosta LAQ:  3# 2 x 10   seated hams curl with t-band:  GTB 20x  **     SAQ:  NV  Shuttle 43 # 12x3      Neuromuscular Re-education:    25 minutes  Marching/alt hip abd/ext:  2 x 10 ea. 1#  with verbal cues for upright posture   B  SLS alternating    10 sec 5x ** with " light UE support   R T-band TKE:  green 15 x 2 **  Side /monster walks yellow  3 sets   **  Sit to stand with 1# airex 10x  no hands              Education:  ex progression with POC     Assessment:  Patient tolerated treatment:well, instructed in progression with lower extremity strength.Patient able to report no pain in the knee but still had 2 falls from last session. Patient is unable  to say what caused her fall.Patient completed todays exercise with no complaints .  Patient needs continued work on/skilled PT for: progression of exercise and  to address remaining functional, objective and subjective deficits to allow them to return to prior /optimal level of function with ADLs.  Patient is progressing with goals: yes  Skilled care:  ex progression     Plan:         Continue to progress per poc:   NV add SAQ    HEP :    TKE,ham curl   Access Code: [email or web address]  URL: [email or web address]  Date: 03/12/2025  Prepared by: Marie     Program Notes  2# for long arc quadsuse counter for squats not walker     Exercises  - Standing Hamstring Stretch on Chair  - 1-2 x daily - 7 x weekly - 1 sets - 3 reps - 30  second hold  - Standing Hip Flexor Stretch with Chair  - 1 x daily - 7 x weekly - 1 sets - 3 reps - 30 second  hold  - Standing March with Counter Support  - 1 x daily - 7 x weekly - 3 sets - 10 reps - 2-3 seconds hold  - Standing Hip Abduction with Counter Support  - 1 x daily - 7 x weekly - 3 sets - 10 reps - 2-3 seconds hold  - Standing Hip Extension with Counter Support  - 1 x daily - 7 x weekly - 3 sets - 10 reps - 2-3 seconds hold  - Mini Squats with Walker and Chair  - 1 x daily - 7 x weekly - 1-2 sets - 10 reps - 2-3 hold  - Seated Long Arc Quad with Ankle Weight  - 1 x daily - 7 x weekly - 2 sets - 10 reps - 2-3 hold  - Seated Hamstring Curl with Anchored Resistance  - 1 x daily - 7 x week

## 2025-03-26 ENCOUNTER — TREATMENT (OUTPATIENT)
Dept: PHYSICAL THERAPY | Facility: CLINIC | Age: 80
End: 2025-03-26
Payer: MEDICARE

## 2025-03-26 DIAGNOSIS — Z96.651 PRESENCE OF RIGHT ARTIFICIAL KNEE JOINT: ICD-10-CM

## 2025-03-26 DIAGNOSIS — S80.01XA CONTUSION OF RIGHT KNEE, INITIAL ENCOUNTER: ICD-10-CM

## 2025-03-26 PROCEDURE — 97112 NEUROMUSCULAR REEDUCATION: CPT | Mod: GP,CQ

## 2025-03-26 PROCEDURE — 97542 WHEELCHAIR MNGMENT TRAINING: CPT | Mod: GP,CQ

## 2025-03-26 NOTE — PROGRESS NOTES
"  Physical Therapy  Physical Therapy Progress Note    Patient Name Regina Tam   MRN: [id number]  Today's Date: 3/26/2025  Time Calculation  Start Time: 0945  Stop Time: 1030  Time Calculation (min): 45 min    Insurance:    Visit #:  4   Insurance Reviewed  (per information provided by  pre-cert team)   [facility]  Authorization required:  Y  MCR certification date range:  3-6-25/6-4-25     Therapy Diagnoses:   1. Contusion of right knee, initial encounter  Follow Up In Physical Therapy      2. Presence of right artificial knee joint  Follow Up In Physical Therapy          General:  Reason for visit: R knee contusion   Referred by: Nicko Nice MD  Next MD appt:  damián  Preferred Name:  Regina  Script:  PT 1-2 x/week for 6 weeks/cert for 6 weeks  Onset Date:  11-15-25  Assessment/re-assessment dates: 3-6-25  Email/phone #:  [email or web address]  Access Code: [email or web address]    Subjective:   Patient reports:   her knee is doing well.    Have you fallen since last visit:  no      PrePrecautions:    Medical Hx/  Fall Risk:  low  Steadi:  4  yes  Precautions: A-fib, s/p R RTC repair, L RTC tendinitis, LS pain,  high cholesterol, see meds in chart     Pain:  0/10  Location/Type of pain:  none  HEP: performing  at home but requires review    Objective:   Objective Measurements:    :    Strength:shuttle 37#        Treatment:   **= HEP  NV= Next visit  np= not performed  nb= non-billable  G= group HEP= discharged to HEP      Therapeutic Exercise:    25   minutes  Nu-step(subjective taken/education):    L3 10'  R hip flexor/calf and stretch:  3/30\" ea.  Shuttle 37# 12x3      Kosta LAQ:  3# 2 x 10   seated hams curl with t-band:  GTB 20x       SAQ: / 3# 15x2   Shuttle 43 # 12x3  SLR 10x          Neuromuscular Re-education:    20 minutes  Marching/alt hip abd/ext:  2 x 10 ea. 1#  with verbal cues for upright posture   B  SLS alternating    10 sec 5x  with light UE support   R T-band TKE:  green 15 x 2   Side /monster walks yellow  3 " sets     Sit to stand with 1# airex 10x  no hands   Side steps over cones 4 sets              Education:  ex progression with POC     Assessment:  Patient tolerated treatment:well, reviewed home program , required cueing in correct exercise performance.  Patient needs continued work on/skilled PT for: progression with exercise and  to address remaining functional, objective and subjective deficits to allow them to return to prior /optimal level of function with ADLs.  Patient is progressing with goals: yes  Skilled care:  ex progression     Plan:         Continue to progress per poc:   NV add dips     HEP :    SLR ,TKE,Ham curl,side walks   Access Code: [email or web address]  URL: [email or web address]  Date: 03/12/2025  Prepared by: Marie     Program Notes  2# for long arc quadsuse counter for squats not walker     Exercises  - Standing Hamstring Stretch on Chair  - 1-2 x daily - 7 x weekly - 1 sets - 3 reps - 30  second hold  - Standing Hip Flexor Stretch with Chair  - 1 x daily - 7 x weekly - 1 sets - 3 reps - 30 second  hold  - Standing March with Counter Support  - 1 x daily - 7 x weekly - 3 sets - 10 reps - 2-3 seconds hold  - Standing Hip Abduction with Counter Support  - 1 x daily - 7 x weekly - 3 sets - 10 reps - 2-3 seconds hold  - Standing Hip Extension with Counter Support  - 1 x daily - 7 x weekly - 3 sets - 10 reps - 2-3 seconds hold  - Mini Squats with Walker and Chair  - 1 x daily - 7 x weekly - 1-2 sets - 10 reps - 2-3 hold  - Seated Long Arc Quad with Ankle Weight  - 1 x daily - 7 x weekly - 2 sets - 10 reps - 2-3 hold  - Seated Hamstring Curl with Anchored Resistance  - 1 x daily - 7 x week

## 2025-03-28 ENCOUNTER — TREATMENT (OUTPATIENT)
Dept: PHYSICAL THERAPY | Facility: CLINIC | Age: 80
End: 2025-03-28
Payer: MEDICARE

## 2025-03-28 DIAGNOSIS — S80.01XA CONTUSION OF RIGHT KNEE, INITIAL ENCOUNTER: ICD-10-CM

## 2025-03-28 DIAGNOSIS — Z96.651 PRESENCE OF RIGHT ARTIFICIAL KNEE JOINT: ICD-10-CM

## 2025-03-28 PROCEDURE — 97112 NEUROMUSCULAR REEDUCATION: CPT | Mod: GP,CQ

## 2025-03-28 PROCEDURE — 97110 THERAPEUTIC EXERCISES: CPT | Mod: GP,CQ

## 2025-03-28 NOTE — PROGRESS NOTES
"  Physical Therapy  Physical Therapy Progress Note    Patient Name Regina Tam   MRN: [id number]  Today's Date: 3/28/2025  Time Calculation  Start Time: 1000  Stop Time: 1045  Time Calculation (min): 45 min    Insurance:    Visit #:  5   Insurance Reviewed  (per information provided by  pre-cert team)   [facility]  Authorization required:  Y  MCR certification date range:  3-6-25/6-4-25  Therapy Diagnoses:   1. Contusion of right knee, initial encounter  Follow Up In Physical Therapy      2. Presence of right artificial knee joint  Follow Up In Physical Therapy               General:  Reason for visit: R knee contusion   Referred by: Nicko Nice MD Next MD appt:  damián  Preferred Name:  Regina  Script:  PT 1-2 x/week for 6 weeks/cert for 6 weeks  Onset Date:  11-15-25  Assessment/re-assessment dates: 3-6-25  Email/phone #:  [email or web address]  Access Code: [email or web address]    Subjective:   Patient reports:   she had some knee sore ness yesterday but better  today.    Have you fallen since last visit:  no      PrePrecautions:    Medical Hx/  Fall Risk:  low  Steadi:  4  yes  Precautions: A-fib, s/p R RTC repair, L RTC tendinitis, LS pain,  high cholesterol, see meds in chart     Pain:  0/10  Location/Type of pain:  none  HEP: performing  at home but requires review         Objective:   Objective Measurements:    Function:   getting out of chair with no hands         Treatment:   **= HEP  NV= Next visit  np= not performed  nb= non-billable  G= group HEP= discharged to [facility]      Therapeutic Exercise:     25 minutes  Nu-step(subjective taken/education):    L3 10'  R hip flexor/calf and stretch:  3/30\" ea.  Shuttle 37# 12x3   Leg extension  10# 12x3  Ham curl 20# 12x3   Step ups 4'' 15x f'l   SAQ: / 3# 15x2   Shuttle 43 # 12x3  SLR 10x                             **NP**                                      Kosta LAQ:  3# 2 x 10                                      Seated  ham red 15x2    Neuromuscular Re-education:    20 " minutes  Marching/alt hip abd/ext:  2 x 10 ea. 1#    B  SLS alternating    10 sec 5x  with light UE support   Sit to stand with 1# airex 10x  no hands   Side steps over cones 4 sets   Standing abduction yellow 10x2**     **NP**  R T-band TKE:  green 15 x 2   Side /monster walks yellow  3 sets      Education:  ex progression with POC     Assessment:  Patient tolerated treatment:well, progressed with lower extremity strength with added equipment.Patient was able to progress with exercise with no complaint of muscle fatigue  but does get SOB easily.Reports she is to a pulmonary doctor .  Patient needs continued work on/skilled PT for: progression with exercise and to address remaining functional, objective and subjective deficits to allow them to return to prior /optimal level of function with ADLs.  Patient is progressing with goals: yes  Skilled care:  ex progression     Plan:         Continue to progress per poc:   NV add step downs     HEP:  Standing hip abd with band    SLR ,TKE,Ham curl,side walks   Access Code: [email or web address]  URL: [email or web address]  Date: 03/12/2025  Prepared by: Marie     Program Notes  2# for long arc quadsuse counter for squats not walker     Exercises  - Standing Hamstring Stretch on Chair  - 1-2 x daily - 7 x weekly - 1 sets - 3 reps - 30  second hold  - Standing Hip Flexor Stretch with Chair  - 1 x daily - 7 x weekly - 1 sets - 3 reps - 30 second  hold  - Standing March with Counter Support  - 1 x daily - 7 x weekly - 3 sets - 10 reps - 2-3 seconds hold  - Standing Hip Abduction with Counter Support  - 1 x daily - 7 x weekly - 3 sets - 10 reps - 2-3 seconds hold  - Standing Hip Extension with Counter Support  - 1 x daily - 7 x weekly - 3 sets - 10 reps - 2-3 seconds hold  - Mini Squats with Walker and Chair  - 1 x daily - 7 x weekly - 1-2 sets - 10 reps - 2-3 hold  - Seated Long Arc Quad with Ankle Weight  - 1 x daily - 7 x weekly - 2 sets - 10 reps - 2-3 hold  -  Seated Hamstring Curl with Anchored Resistance  - 1 x daily - 7 x week

## 2025-04-03 ENCOUNTER — ANTICOAGULATION - WARFARIN VISIT (OUTPATIENT)
Dept: CARDIOLOGY | Facility: CLINIC | Age: 80
End: 2025-04-03
Payer: MEDICARE

## 2025-04-03 DIAGNOSIS — I48.91 ATRIAL FIBRILLATION, UNSPECIFIED TYPE (MULTI): Primary | ICD-10-CM

## 2025-04-03 LAB
POC INR: 2.4
POC PROTHROMBIN TIME: NORMAL

## 2025-04-03 PROCEDURE — 85610 PROTHROMBIN TIME: CPT | Mod: QW

## 2025-04-03 PROCEDURE — 99211 OFF/OP EST MAY X REQ PHY/QHP: CPT

## 2025-04-03 NOTE — PROGRESS NOTES
Patient identification verified with 2 identifiers.    Location: [facility] - [address] [place] [address] [phone]     Referring Physician: Dr Jillian Vizcarra  Enrollment/ Re-enrollment date: 2025  INR Goal: 2.0-3.0  INR monitoring is per [facility] protocol.  Anticoagulation Medication: warfarin  Indication: Atrial Fibrillation/Atrial Flutter    Subjective   Bleeding signs/symptoms: No    Bruising: No   Major bleeding event: No  Thrombosis signs/symptoms: No  Thromboembolic event: No  Missed doses: No  Extra doses: No  Medication changes: No  Dietary changes: No  Change in health: No  Change in activity: No  Alcohol: No  Other concerns: No    Upcoming Procedures:  Does the Patient Have any upcoming procedures that require interruption in anticoagulation therapy? no  Does the patient require bridging? no      Anticoagulation Summary  As of 4/3/2025      INR goal:  2.0-3.0   TTR:  77.7% (1.5 y)   INR used for dosin.40 (4/3/2025)   Weekly warfarin total:  37.5 mg               Assessment/Plan   Therapeutic     1. New dose: Will maintain dose and patient will return in 4 weeks.    2. Next INR: 1 month      Education provided to patient during the visit:  Patient instructed to call in interim with questions, concerns and changes.

## 2025-04-04 ENCOUNTER — TREATMENT (OUTPATIENT)
Dept: PHYSICAL THERAPY | Facility: CLINIC | Age: 80
End: 2025-04-04
Payer: MEDICARE

## 2025-04-04 DIAGNOSIS — S80.01XA CONTUSION OF RIGHT KNEE, INITIAL ENCOUNTER: ICD-10-CM

## 2025-04-04 DIAGNOSIS — Z96.651 PRESENCE OF RIGHT ARTIFICIAL KNEE JOINT: ICD-10-CM

## 2025-04-04 PROCEDURE — 97112 NEUROMUSCULAR REEDUCATION: CPT | Mod: GP,CQ

## 2025-04-04 PROCEDURE — 97110 THERAPEUTIC EXERCISES: CPT | Mod: GP,CQ

## 2025-04-04 NOTE — PROGRESS NOTES
"  Physical Therapy  Physical Therapy Progress Note    Patient Name Regina Tam   MRN: [id number]  Today's Date: 4/4/2025  Time Calculation  Start Time: 1000  Stop Time: 1045  Time Calculation (min): 45 min    Insurance:    Visit #:  6   Insurance Reviewed  (per information provided by  pre-cert team)   [facility]  Authorization required:  Y  MCR certification date range:  3-6-25/6-4-25(signed)  Therapy Diagnoses:   1. Contusion of right knee, initial encounter  Follow Up In Physical Therapy      2. Presence of right artificial knee joint  Follow Up In Physical Therapy          General:  Reason for visit: R knee contusion   Referred by: Nicko Nice MD  Next MD appt:  damián  Preferred Name:  Regina  Script:  PT 1-2 x/week for 6 weeks/cert for 6 weeks  Onset Date:  11-15-25  Assessment/re-assessment dates: 3-6-25  Email/phone #:  [email or web address]  Access Code: [email or web address]    Subjective:   Patient reports:   the pain remains the same     Have you fallen since last visit:  no      Precautions:  low fall risk   A-fib, s/p R RTC repair, L RTC tendinitis, LS pain,  high cholesterol, see meds in chart    Pain:  5-6/10  Location/Type of pain:  patellar tendon aching with WB     HEP compliance/understanding:  yes       Objective:   Objective Measurements:    Balance: SL on airex with light hand support     Treatment:   **= HEP  NV= Next visit  np= not performed  nb= non-billable  G= group HEP= discharged to [facility]      Therapeutic Exercise:     30 minutes  Nu-step(subjective taken/education):    L3 10'  R hip flexor/calf and stretch:  3/30\" ea.  R knee flexion stretch:  10/10\"  Leg Press:  Seat:  9/40#/ 2 x 10(used L foot on pad to get R LE onto machine  Calf Raises on Leg Press:  Seat:  9/40#/ 2 x 10  Seated Hip abd:  Start:  0/30#/ 2 x 10  Seated Hip add:  Start:  1/20#/ 2 x 10  Matrix leg extension 20# 15x2  Matrix ham curl 30# 12x2         Neuromuscular Re-education:    15 minutes  Step ups for/lat:  6\" B UE support:  for: " " x 19  lat:  x 10/ x 5  Step downs:  4\" B UE support:  x 10  Airex R SLS:  x 5/2\" max         Education:  ex progression with POC     Assessment:  Patient tolerated treatment: well, progressed with equipment .Patient tolerated today's exercise well.  Patient needs continued work on/skilled PT for: progression with exercise and  to address remaining functional, objective and subjective deficits to allow them to return to prior /optimal level of function with ADLs.  Patient is progressing with goals: yes  Skilled care:   ex progression on equipment     Plan:         Continue to progress per poc:   NV  review equipment   HEP :      Standing hip abd with band     SLR ,TKE,Ham curl,side walks   Access Code: [email or web address]  URL: [email or web address]  Date: 03/12/2025  Prepared by: Marie     Program Notes  2# for long arc quadsuse counter for squats not walker     Exercises  - Standing Hamstring Stretch on Chair  - 1-2 x daily - 7 x weekly - 1 sets - 3 reps - 30  second hold  - Standing Hip Flexor Stretch with Chair  - 1 x daily - 7 x weekly - 1 sets - 3 reps - 30 second  hold  - Standing March with Counter Support  - 1 x daily - 7 x weekly - 3 sets - 10 reps - 2-3 seconds hold  - Standing Hip Abduction with Counter Support  - 1 x daily - 7 x weekly - 3 sets - 10 reps - 2-3 seconds hold  - Standing Hip Extension with Counter Support  - 1 x daily - 7 x weekly - 3 sets - 10 reps - 2-3 seconds hold  - Mini Squats with Walker and Chair  - 1 x daily - 7 x weekly - 1-2 sets - 10 reps - 2-3 hold  - Seated Long Arc Quad with Ankle Weight  - 1 x daily - 7 x weekly - 2 sets - 10 reps - 2-3 hold  - Seated Hamstring Curl with Anchored Resistance  - 1 x daily - 7 x week           "

## 2025-04-09 ENCOUNTER — TREATMENT (OUTPATIENT)
Dept: PHYSICAL THERAPY | Facility: CLINIC | Age: 80
End: 2025-04-09
Payer: MEDICARE

## 2025-04-09 DIAGNOSIS — Z96.651 PRESENCE OF RIGHT ARTIFICIAL KNEE JOINT: ICD-10-CM

## 2025-04-09 DIAGNOSIS — S80.01XA CONTUSION OF RIGHT KNEE, INITIAL ENCOUNTER: ICD-10-CM

## 2025-04-09 PROCEDURE — 97110 THERAPEUTIC EXERCISES: CPT | Mod: GP | Performed by: PHYSICAL THERAPIST

## 2025-04-09 NOTE — PROGRESS NOTES
"Physical Therapy  Physical Therapy Progress Note    Patient Name Regina Tam   MRN: [id number]  Today's Date: 4/9/2025  Time Calculation  Start Time: 1145  Stop Time: 1225  Time Calculation (min): 40 min    Insurance:    Visit #:   7   Insurance Reviewed  (per information provided by  pre-cert team)   [facility]  Authorization required:  N  [facility] certification date range:  3-6-25/6-4-25(signed)    Therapy Diagnoses:   Problem List Items Addressed This Visit             ICD-10-CM    Contusion of right knee S80.01XA    Presence of right artificial knee joint Z96.651     General:  Reason for visit: R knee contusion   Referred by: Nicko Nice MD  Next MD appt:  damián  Preferred Name:  Regina  Script:  PT 1-2 x/week for 6 weeks/cert for 6 weeks  Onset Date:  11-15-25  Assessment/re-assessment dates: 3-6-25  Email/phone #:  [email or web address]  Access Code: [email or web address]    Precautions:  low fall risk  A-fib, s/p R RTC repair, L RTC tendinitis, LS pain, high cholesterol, see meds in chart     Subjective:   Patient reports:  that the R knee is sore by late afternoon/early morning.  Patient reports that she may contact MD re: further evaluation.      Have you fallen since last visit:  no    Pain:  0/10, worse as the day goes on, up to 8-9/10 at night/sharp  Location/Type of pain:  ant R knee    HEP compliance/understanding:  yes    Objective:   Objective Measurements:    Function:   plans to go to the .      Treatment:   **= HEP progression today NV= Next visit  np= not performed  nb= non-billable  G= group HEP= discharged to HEP  Therapeutic Exercise:     40 minutes  Nu-step(subjective taken/education):    L3 10'  R hip flexor/calf and stretch:  3/30\" ea.  R knee flexion stretch:  10/10\"  NO SHOULDER PRESS/ROTARY TORSO/BACK EXTENSION  Matrix Leg Press:  Seat: 9/      40#/ 2 x 10  Matrix Toe Raises(on Leg Press): Seat: 9     /        40#/ 2 x 10  Seated Hip abd: Start:  0/       30#/ 2 x 10  Seated Hip add:  Start:  1/ " 20#/ 2 x 10  Matrix Leg curl(back hallway):  Leg Bar:  knees straight  Thigh bar: down to thighs /        30#/ 2 x 10  Matrix Leg Extension(back hallway):   Leg bar: 5 /        20#/ 2 x 10    Education:  review of gym equipment and issued handout.     Assessment:  Patient tolerated treatment well, did well with progression this date.    Patient needs continued work on/skilled PT for: functional and closed chain strength to address remaining functional, objective and subjective deficits to allow them to return to prior /optimal level of function with ADLs.  Patient is progressing with goals: HEP compliance  Skilled care:  exercise progression    Plan:    Continue to progress per poc:   NV add review of gym program prn and continue to progress LE strength.  Patient may look into further testing with MD.     HEP:      Standing hip abd with band     SLR ,TKE,Ham curl,side walks   Access Code: [email or web address]  URL: [email or web address]  Date: 03/12/2025  Prepared by: Marie     Program Notes  2# for long arc quadsuse counter for squats not walker     Exercises  - Standing Hamstring Stretch on Chair  - 1-2 x daily - 7 x weekly - 1 sets - 3 reps - 30  second hold  - Standing Hip Flexor Stretch with Chair  - 1 x daily - 7 x weekly - 1 sets - 3 reps - 30 second  hold  - Standing March with Counter Support  - 1 x daily - 7 x weekly - 3 sets - 10 reps - 2-3 seconds hold  - Standing Hip Abduction with Counter Support  - 1 x daily - 7 x weekly - 3 sets - 10 reps - 2-3 seconds hold  - Standing Hip Extension with Counter Support  - 1 x daily - 7 x weekly - 3 sets - 10 reps - 2-3 seconds hold  - Mini Squats with Walker and Chair  - 1 x daily - 7 x weekly - 1-2 sets - 10 reps - 2-3 hold  - Seated Long Arc Quad with Ankle Weight  - 1 x daily - 7 x weekly - 2 sets - 10 reps - 2-3 hold  - Seated Hamstring Curl with Anchored Resistance  - 1 x daily - 7 x week

## 2025-04-11 ENCOUNTER — APPOINTMENT (OUTPATIENT)
Dept: PHYSICAL THERAPY | Facility: CLINIC | Age: 80
End: 2025-04-11
Payer: MEDICARE

## 2025-04-11 DIAGNOSIS — S80.01XA CONTUSION OF RIGHT KNEE, INITIAL ENCOUNTER: ICD-10-CM

## 2025-04-11 DIAGNOSIS — Z96.651 PRESENCE OF RIGHT ARTIFICIAL KNEE JOINT: ICD-10-CM

## 2025-04-14 ENCOUNTER — TREATMENT (OUTPATIENT)
Dept: PHYSICAL THERAPY | Facility: CLINIC | Age: 80
End: 2025-04-14
Payer: MEDICARE

## 2025-04-14 DIAGNOSIS — Z96.651 PRESENCE OF RIGHT ARTIFICIAL KNEE JOINT: ICD-10-CM

## 2025-04-14 DIAGNOSIS — S80.01XA CONTUSION OF RIGHT KNEE, INITIAL ENCOUNTER: ICD-10-CM

## 2025-04-14 PROCEDURE — 97110 THERAPEUTIC EXERCISES: CPT | Mod: GP,CQ

## 2025-04-14 NOTE — PROGRESS NOTES
"Physical Therapy  Physical Therapy Progress Note    Patient Name Regina Tam   MRN: [id number]  Today's Date: 4/14/2025  Time Calculation  Start Time: 1138  Stop Time: 1216  Time Calculation (min): 38 min    Insurance:    Visit #:   8   Insurance Reviewed  (per information provided by  pre-cert team)   [facility]  Authorization required:  N  [facility] certification date range:  3-6-25/6-4-25(signed)    Therapy Diagnoses:   Problem List Items Addressed This Visit             ICD-10-CM    Contusion of right knee S80.01XA    Presence of right artificial knee joint Z96.651     General:  Reason for visit: R knee contusion   Referred by: Nicko Nice MD  Next MD appt:  damián  Preferred Name:  Regina  Script:  PT 1-2 x/week for 6 weeks/cert for 6 weeks  Onset Date:  11-15-25  Assessment/re-assessment dates: 3-6-25  Email/phone #:  [email or web address]  Access Code: [email or web address]    Precautions:  low fall risk  A-fib, s/p R RTC repair, L RTC tendinitis, LS pain, high cholesterol, see meds in chart     Subjective:   Patient reports:  arrives with pain at a 5-6, by 6 PM will be can go to an 8-9  with difficulty getting in/out of a chair.  Patient reports that she will contact MD re: further evaluation after the therapy.    Have you fallen since last visit:  no    Pain:  5-6/10, worse as the day goes on, up to 8-9/10 at night/sharp  Location/Type of pain:  ant R knee    HEP compliance/understanding:  yes/stops when the pain is bad    Objective:   Objective Measurements:    Function[facility]      Treatment:   **= HEP progression today NV= Next visit  np= not performed  nb= non-billable  G= group HEP= discharged to HEP  Therapeutic Exercise:     38 minutes  Nu-step(subjective taken/education):    L3 10'  R hip flexor/calf and stretch:  3/30\" ea.  R knee flexion stretch:  10/10\"  NO SHOULDER PRESS/ROTARY TORSO/BACK EXTENSION  Matrix Leg Press:  Seat: 9/ 40#/ 2 x 10  Matrix Toe Raises(on Leg Press): Seat: 9 / " 40#/ 2 x 10  Seated Hip abd: Start:  0/       30#/ 2 x 10  Seated Hip add:  Start:  1/      20#/ 2 x 10  Matrix Leg curl(back hallway):  Leg Bar:  knees straight  Thigh bar: down to thighs /        30#/ 2 x 10  Matrix Leg Extension(back hallway):   Leg bar: 5 /        20#/ 2 x 10    Education:  review of gym equipment /discussed fall prevention    Assessment:  Patient tolerated treatment well, did well with progression this date.    Emphasized safety and fall prevention with pt.  Patient needs continued work on/skilled PT for: functional and closed chain strength to address remaining functional, objective and subjective deficits to allow them to return to prior /optimal level of function with ADLs.  Patient is progressing with goals: HEP compliance  Skilled care:  exercise progression    Plan:    Continue to progress per poc:   NV progress strengthening in gym    HEP:      Standing hip abd with band     SLR ,TKE,Ham curl,side walks   Access Code: [email or web address]  URL: [email or web address]  Date: 03/12/2025  Prepared by: Marie     Program Notes  2# for long arc quadsuse counter for squats not walker     Exercises  - Standing Hamstring Stretch on Chair  - 1-2 x daily - 7 x weekly - 1 sets - 3 reps - 30  second hold  - Standing Hip Flexor Stretch with Chair  - 1 x daily - 7 x weekly - 1 sets - 3 reps - 30 second  hold  - Standing March with Counter Support  - 1 x daily - 7 x weekly - 3 sets - 10 reps - 2-3 seconds hold  - Standing Hip Abduction with Counter Support  - 1 x daily - 7 x weekly - 3 sets - 10 reps - 2-3 seconds hold  - Standing Hip Extension with Counter Support  - 1 x daily - 7 x weekly - 3 sets - 10 reps - 2-3 seconds hold  - Mini Squats with Walker and Chair  - 1 x daily - 7 x weekly - 1-2 sets - 10 reps - 2-3 hold  - Seated Long Arc Quad with Ankle Weight  - 1 x daily - 7 x weekly - 2 sets - 10 reps - 2-3 hold  - Seated Hamstring Curl with Anchored Resistance  - 1 x daily - 7 x week

## 2025-04-16 ENCOUNTER — TREATMENT (OUTPATIENT)
Dept: PHYSICAL THERAPY | Facility: CLINIC | Age: 80
End: 2025-04-16
Payer: MEDICARE

## 2025-04-16 DIAGNOSIS — Z96.651 PRESENCE OF RIGHT ARTIFICIAL KNEE JOINT: ICD-10-CM

## 2025-04-16 DIAGNOSIS — S80.01XA CONTUSION OF RIGHT KNEE, INITIAL ENCOUNTER: ICD-10-CM

## 2025-04-16 PROCEDURE — 97110 THERAPEUTIC EXERCISES: CPT | Mod: GP | Performed by: PHYSICAL THERAPIST

## 2025-04-16 NOTE — PROGRESS NOTES
Physical Therapy  Physical Therapy Progress Note/re-assessment    Patient Name Regina Tam   MRN: [id number]  Today's Date: 4/16/2025  Time Calculation  Start Time: 1145  Stop Time: 1230  Time Calculation (min): 45 min    Insurance:    Visit #:   9   Insurance Reviewed  (per information provided by  pre-cert team)   [facility]  Authorization required:  N  [facility] certification date range:  3-6-25/6-4-25(signed)    Therapy Diagnoses:   Problem List Items Addressed This Visit           ICD-10-CM    Contusion of right knee S80.01XA    Presence of right artificial knee joint Z96.651     General:  Reason for visit: R knee contusion   Referred by: Nicko Nice MD Next MD appt:  damián  Preferred Name:  Regina  Script:  PT 1-2 x/week for 6 weeks/cert for 6 weeks  Onset Date:  11-15-25  Assessment/re-assessment dates: 3-6-25/4-16-25  Email/phone #:  [email or web address]  Access Code: [email or web address]    Precautions:   low fall risk  A-fib, s/p R RTC repair, L RTC tendinitis, LS pain, high cholesterol, see meds in chart     Subjective:   Patient reports:  that her legs are stronger, but the R knee still hurts.  She is having pain this morning, it usually does not come on until later in the day.  Patient plans to follow up with Dr. Nice re: continued pain.  Patient going to pulmonologist on Monday due to continued SOB.  Cardiac work up was -.    Have you fallen since last visit:  no    Pain:  5/10  Location/Type of pain:  R patellar tendon.      HEP compliance/understanding:  yes    Objective:   Objective Measurements:    Sleep: WFL, some L shoulder pain at times when shots wear off,  instructed in proper postures.  ADL's:  feels safe with dressing, but still needs to lean(was a little off balance with dressing, leans to dress)  Chores:  prepping for the Holiday/takes breaks prn (was does her own laundry, has a clean lady.  )  Driving:  less difficulty with transfers at times.  At times, needs to manually assist R UE into car.    Work:  "retired  Recreation: Feels that she knows what to do with machines (was [employer] member, wants to resume machine program.)     Objective:    Outcome Measures:  Other Measures  Lower Extremity Funtional Score (LEFS): 43/33%(was 40 /38%) limitation of function     Posture:  decreased WB LE,  R LE slightly ER at hip/R genu valgus.       Gait/stairs:  decreased stance time, decreased hip ext and trunk rotation, LE ER throughout cycle and step to pattern on stairs with B UE support with L LE WB.    25:  Symmetrical WB and improved hip extension and trunk rotation.       Balance:   R SLS:  17\"(was 9\")     5 x sit-stand:  B UE on arm rests 21.85\"(was 24.49\")  normals:  60-69 years of age: 11.4 seconds  70 - 79 years of age: 12.6 seconds **  80 - 89 years of age: 14. 8 seconds    TU.17\" no device/B UE on arm rests  normals:  60-69 years of age (7.1 - 9.0 seconds)  70 - 79 years of age: (8.2 - 10.2 seconds)**  80 - 99 years of age (10 - 12.7 seconds)     ROM:  R knee ext/flex:  A: 0/122(was 0/120)     MMT:   Hip flexors: 4+/5(was 4-/5)  ext: (tested side lying):  4+/5(was 4-/5)  abd: 4+/5(was 4-/5)  add: 4/5(was 4-/5)  Quads: 4+/5(was 4/5)  Hams: (tested side lying):  4+/5(was 4-/5)  DF:  5/5(was 4/5)  PF: (NWB):  5/5(was 4/5)     Flexibility:    Hams:  90/90: -25(was -23)  Heelcords: 2(was 0)  Hip flexors:  min/mod(was mod)     Palpation:  - pop    Treatment:   **= HEP progression today NV= Next visit  np= not performed  nb= non-billable  G= group HEP= discharged to [facility]  Therapeutic Exercise:     45 minutes  Nu-step(subjective taken/education):    L3 10'  R hip flexor/calf and stretch:  3/30\" ea.  R knee flexion stretch:  10/10\"  Re-assessment    Education:  Issued copy of gym program.  Poc    Assessment:  Skilled care:  re-assessment  STG:  In two weeks.   -Increased postural awareness. Met  -Compliant with HEP.  Met  LTG: by discharge  -Increased postural awareness and posture WFL. Met  -Increased function with ADL's " "and IADL's.  Improve LEFS to:  20 %  limitation of function. Partially Met  -Normal gait pattern  on level and uneven surfaces community level distances for improved function in the community.  Partially Met  -Normal reciprocal pattern on stairs with one rail for improved function to all levels of home.  Met  -Increase  R SLS to 15\".  Partially Met  -Decrease 5 x sit to stand to within norms for age.  Partially Met  -Increase R LE strength to WNL for improved function with chores. Met  -Increase R LE flexibility to WFL.  Met  -I and compliant with HEP and proper: R LE care and I with proper gym program.  Progressing    Plan:    Continue to progress per poc:   NV add review of gym program and HEP over next two visits and insure I and then discharge to HEP and patient to follow up with MD re:  continued R knee pain.     HEP:      Standing hip abd with band     SLR ,TKE,Ham curl,side walks   Access Code: [email or web address]  URL: [email or web address]  Date: 03/12/2025  Prepared by: Mraie     Program Notes  2# for long arc quadsuse counter for squats not walker     Exercises  - Standing Hamstring Stretch on Chair  - 1-2 x daily - 7 x weekly - 1 sets - 3 reps - 30  second hold  - Standing Hip Flexor Stretch with Chair  - 1 x daily - 7 x weekly - 1 sets - 3 reps - 30 second  hold  - Standing March with Counter Support  - 1 x daily - 7 x weekly - 3 sets - 10 reps - 2-3 seconds hold  - Standing Hip Abduction with Counter Support  - 1 x daily - 7 x weekly - 3 sets - 10 reps - 2-3 seconds hold  - Standing Hip Extension with Counter Support  - 1 x daily - 7 x weekly - 3 sets - 10 reps - 2-3 seconds hold  - Mini Squats with Walker and Chair  - 1 x daily - 7 x weekly - 1-2 sets - 10 reps - 2-3 hold  - Seated Long Arc Quad with Ankle Weight  - 1 x daily - 7 x weekly - 2 sets - 10 reps - 2-3 hold  - Seated Hamstring Curl with Anchored Resistance  - 1 x daily - 7 x week        NO SHOULDER PRESS/ROTARY TORSO/BACK " EXTENSION  Matrix Leg Press:  Seat: 9/      40#/ 2 x 10  Matrix Toe Raises(on Leg Press): Seat: 9 /        40#/ 2 x 10  Seated Hip abd: Start:  0/       30#/ 2 x 10  Seated Hip add:  Start:  1/      20#/ 2 x 10  Matrix Leg curl(back hallway):  Leg Bar:  knees straight  Thigh bar: down to thighs /        30#/ 2 x 10  Matrix Leg Extension(back hallway):   Leg bar: 5 /        20#/ 2 x 10

## 2025-04-23 ENCOUNTER — TREATMENT (OUTPATIENT)
Dept: PHYSICAL THERAPY | Facility: CLINIC | Age: 80
End: 2025-04-23
Payer: MEDICARE

## 2025-04-23 DIAGNOSIS — Z96.651 PRESENCE OF RIGHT ARTIFICIAL KNEE JOINT: ICD-10-CM

## 2025-04-23 DIAGNOSIS — S80.01XA CONTUSION OF RIGHT KNEE, INITIAL ENCOUNTER: ICD-10-CM

## 2025-04-23 PROCEDURE — 97110 THERAPEUTIC EXERCISES: CPT | Mod: GP,CQ

## 2025-04-23 NOTE — PROGRESS NOTES
"  Physical Therapy  Physical Therapy Progress Note    Patient Name Regina Tam   MRN: [id number]  Today's Date: 4/23/2025  Time Calculation  Start Time: 1130  Stop Time: 1210  Time Calculation (min): 40 min    Insurance:    Visit #:  10   Insurance Reviewed  (per information provided by  pre-cert team)   [facility]  Authorization required:  N  [facility] certification date range:  3-6-25/6-4-25(signed)  Therapy Diagnoses:   1. Contusion of right knee, initial encounter  Follow Up In Physical Therapy      2. Presence of right artificial knee joint  Follow Up In Physical Therapy          General:  Reason for visit: R knee contusion   Referred by: Nicko Nice MD  Next MD appt:  damián  Preferred Name:  Regina  Script:  PT 1-2 x/week for 6 weeks/cert for 6 weeks  Onset Date:  11-15-25  Assessment/re-assessment dates: 3-6-25/4-16-25  Email/phone #:  [email or web address]  Access Code: [email or web address]       Subjective:   Patient reports:   she wants to make this her last session . No change in complaints    Have you fallen since last visit:  no      Precautions:   low fall risk  A-fib, s/p R RTC repair, L RTC tendinitis, LS pain, high cholesterol, see meds in chart    Pain:  5/10  Location/Type of pain:  right patella tendon    HEP compliance/understanding:  yes    Objective:   Objective Measurements:    Gait:ambulated around walking track x 1, some SOB from asthma      Strength:hip in multi plane on matrix 25#        Treatment:   **= HEP  NV= Next visit  np= not performed  nb= non-billable  G= group HEP= discharged to [facility]      Therapeutic Exercise:     40 minutes  Nu-step(subjective taken/education):    L3 10'  Walking track 1 lap  R hip flexor/calf and stretch:  3/30\" ea.  R knee flexion stretch:  10/10\"  NO SHOULDER PRESS/ROTARY TORSO/BACK EXTENSION  Matrix Leg Press:  Seat: 9/      40#/ 2 x 10  Matrix Toe Raises(on Leg Press): Seat: 9     /        40#/ 2 x 10  Seated Hip abd: Start:  0/       30#/ 2 x 10  Seated Hip add:  Start:  " 1/      20#/ 2 x 10  Matrix hip abd/add/flex 25# 15x   Matrix Leg curl(back hallway):  Leg Bar:  knees straight  Thigh bar: down to thighs /        30#/ 2 x 10  Matrix Leg Extension(back hallway):   Leg bar: 5 /        20#/ 2 x 10                Education:  ex progression with POC     Assessment:  Patient tolerated treatment: well, reports she wants to continue on her own ands return to the Doctor.Patient plans to use equipment in the gym and reviewed set up and added matrix hip abd/add/flex. Patient reported she was more comfortable with standing hip machine verses seated.Instructed in walking track for future use.  .  Patient  has progressing with goals: yes  Skilled care:  education in gym equipment    Plan:         Continue to progress per poc:   Last session , patient to continue on  her own    HEP :       SLR ,TKE,Ham curl,side walks   Access Code: [email or web address]  URL: [email or web address]  Date: 03/12/2025  Prepared by: Marie     Program Notes  2# for long arc quadsuse counter for squats not walker     Exercises  - Standing Hamstring Stretch on Chair  - 1-2 x daily - 7 x weekly - 1 sets - 3 reps - 30  second hold  - Standing Hip Flexor Stretch with Chair  - 1 x daily - 7 x weekly - 1 sets - 3 reps - 30 second  hold  - Standing March with Counter Support  - 1 x daily - 7 x weekly - 3 sets - 10 reps - 2-3 seconds hold  - Standing Hip Abduction with Counter Support  - 1 x daily - 7 x weekly - 3 sets - 10 reps - 2-3 seconds hold  - Standing Hip Extension with Counter Support  - 1 x daily - 7 x weekly - 3 sets - 10 reps - 2-3 seconds hold  - Mini Squats with Walker and Chair  - 1 x daily - 7 x weekly - 1-2 sets - 10 reps - 2-3 hold  - Seated Long Arc Quad with Ankle Weight  - 1 x daily - 7 x weekly - 2 sets - 10 reps - 2-3 hold  - Seated Hamstring Curl with Anchored Resistance  - 1 x daily - 7 x week

## 2025-04-25 ENCOUNTER — APPOINTMENT (OUTPATIENT)
Dept: PHYSICAL THERAPY | Facility: CLINIC | Age: 80
End: 2025-04-25
Payer: MEDICARE

## 2025-04-25 DIAGNOSIS — S80.01XA CONTUSION OF RIGHT KNEE, INITIAL ENCOUNTER: ICD-10-CM

## 2025-04-25 DIAGNOSIS — Z96.651 PRESENCE OF RIGHT ARTIFICIAL KNEE JOINT: ICD-10-CM

## 2025-05-01 ENCOUNTER — ANTICOAGULATION - WARFARIN VISIT (OUTPATIENT)
Dept: CARDIOLOGY | Facility: CLINIC | Age: 80
End: 2025-05-01
Payer: MEDICARE

## 2025-05-01 DIAGNOSIS — I48.91 ATRIAL FIBRILLATION, UNSPECIFIED TYPE (MULTI): Primary | ICD-10-CM

## 2025-05-01 LAB
POC INR: 2.4 (ref 0.9–1.1)
POC PROTHROMBIN TIME: ABNORMAL (ref 9.3–12.5)

## 2025-05-01 PROCEDURE — 99211 OFF/OP EST MAY X REQ PHY/QHP: CPT

## 2025-05-01 PROCEDURE — 85610 PROTHROMBIN TIME: CPT | Mod: QW

## 2025-05-01 NOTE — PROGRESS NOTES
Patient identification verified with 2 identifiers.    Location: [facility] - [address] [place] [address] [phone]     Referring Physician: Dr Jillian Vizcarra  Enrollment/ Re-enrollment date: 2025  INR Goal: 2.0-3.0  INR monitoring is per [facility] protocol.  Anticoagulation Medication: warfarin  Indication: Atrial Fibrillation/Atrial Flutte    Subjective   Bleeding signs/symptoms: No    Bruising: No   Major bleeding event: No  Thrombosis signs/symptoms: No  Thromboembolic event: No  Missed doses: No  Extra doses: No  Medication changes: No  Dietary changes: No  Change in health: No  Change in activity: No  Alcohol: No  Other concerns: No    Upcoming Procedures:  Does the Patient Have any upcoming procedures that require interruption in anticoagulation therapy? no  Does the patient require bridging? no      Anticoagulation Summary  As of 2025      INR goal:  2.0-3.0   TTR:  78.8% (1.6 y)   INR used for dosin.40 (2025)   Weekly warfarin total:  37.5 mg               Assessment/Plan   Therapeutic     1. New dose: Will maintain dose and patient will return in 4 weeks.    2. Next INR: 1 month      Education provided to patient during the visit:  Patient instructed to call in interim with questions, concerns and changes.

## 2025-05-15 ENCOUNTER — APPOINTMENT (OUTPATIENT)
Dept: ORTHOPEDIC SURGERY | Facility: CLINIC | Age: 80
End: 2025-05-15
Payer: MEDICARE

## 2025-05-15 DIAGNOSIS — M25.512 CHRONIC LEFT SHOULDER PAIN: ICD-10-CM

## 2025-05-15 DIAGNOSIS — G89.29 CHRONIC LEFT SHOULDER PAIN: ICD-10-CM

## 2025-05-15 DIAGNOSIS — M75.82 ROTATOR CUFF TENDINITIS, LEFT: Primary | ICD-10-CM

## 2025-05-15 PROCEDURE — 1159F MED LIST DOCD IN RCRD: CPT | Performed by: ORTHOPAEDIC SURGERY

## 2025-05-15 PROCEDURE — 99213 OFFICE O/P EST LOW 20 MIN: CPT | Performed by: ORTHOPAEDIC SURGERY

## 2025-05-15 PROCEDURE — 1036F TOBACCO NON-USER: CPT | Performed by: ORTHOPAEDIC SURGERY

## 2025-05-15 PROCEDURE — 1160F RVW MEDS BY RX/DR IN RCRD: CPT | Performed by: ORTHOPAEDIC SURGERY

## 2025-05-15 PROCEDURE — 20610 DRAIN/INJ JOINT/BURSA W/O US: CPT | Performed by: ORTHOPAEDIC SURGERY

## 2025-05-15 RX ADMIN — LIDOCAINE HYDROCHLORIDE 2 ML: 10 INJECTION, SOLUTION INFILTRATION; PERINEURAL at 14:38

## 2025-05-15 RX ADMIN — TRIAMCINOLONE ACETONIDE 40 MG: 40 INJECTION, SUSPENSION INTRA-ARTICULAR; INTRAMUSCULAR at 14:38

## 2025-05-17 RX ORDER — TRIAMCINOLONE ACETONIDE 40 MG/ML
40 INJECTION, SUSPENSION INTRA-ARTICULAR; INTRAMUSCULAR
Status: COMPLETED | OUTPATIENT
Start: 2025-05-15 | End: 2025-05-15

## 2025-05-17 RX ORDER — LIDOCAINE HYDROCHLORIDE 10 MG/ML
2 INJECTION, SOLUTION INFILTRATION; PERINEURAL
Status: COMPLETED | OUTPATIENT
Start: 2025-05-15 | End: 2025-05-15

## 2025-05-17 NOTE — PROGRESS NOTES
79-year-old is seen with left shoulder pain.  She has been having persistent moderate throbbing pain in the left shoulder.  Pain is worse with overhead reaching and lifting activities.  Cortisone has helped in the past.    Pleasant and no acute distress.  Left shoulder forward flexion 160°. No effusion or instability. There is positive Neer and Hess impingement. There is subacromial crepitus and tenderness around the subacromial space. No biceps tenderness. No acromioclavicular tenderness. Rotator cuff strength is intact but there is discomfort with strength testing. Right shoulder forward flexion 180°. No effusion or instability. No impingement or crepitus or tenderness involving the subacromial space. No biceps or acromioclavicular tenderness. There is intact rotator cuff strength. There is adequate range of motion of the cervical spine without pain. Both upper extremities are well perfused skin is intact and muscle tone is adequate. Elbow flexion and extension and wrist flexion and extension strength are intact.    A discussion about her shoulder and rotator cuff problems was done.  Treatment options were reviewed.  Decision was made to proceed with cortisone injection.  She can use Tylenol and apply ice or heat and avoid aggravating activities.  Returning to formal physical therapy could also be helpful if symptoms persist.    L Inj/Asp: L subacromial bursa on 5/15/2025 2:38 PM  Indications: pain  Details: 22 G needle, posterior approach  Medications: 40 mg triamcinolone acetonide 40 mg/mL; 2 mL lidocaine 10 mg/mL (1 %)  Procedure, treatment alternatives, risks and benefits explained, specific risks discussed. Consent was given by the patient.

## 2025-05-29 ENCOUNTER — APPOINTMENT (OUTPATIENT)
Dept: CARDIOLOGY | Facility: CLINIC | Age: 80
End: 2025-05-29
Payer: MEDICARE

## 2025-05-29 ENCOUNTER — ANTICOAGULATION - WARFARIN VISIT (OUTPATIENT)
Dept: CARDIOLOGY | Facility: CLINIC | Age: 80
End: 2025-05-29
Payer: MEDICARE

## 2025-05-29 DIAGNOSIS — I48.91 ATRIAL FIBRILLATION, UNSPECIFIED TYPE (MULTI): Primary | ICD-10-CM

## 2025-05-29 LAB
POC INR: 2 (ref 0.9–1.1)
POC PROTHROMBIN TIME: ABNORMAL (ref 9.3–12.5)

## 2025-05-29 PROCEDURE — 99211 OFF/OP EST MAY X REQ PHY/QHP: CPT

## 2025-05-29 PROCEDURE — 85610 PROTHROMBIN TIME: CPT | Mod: QW

## 2025-05-29 NOTE — PROGRESS NOTES
Patient identification verified with 2 identifiers.    Location: [facility] - [address] [place] [address] [phone]     Referring Physician: Jillian Vizcarra DO   Enrollment/ Re-enrollment date: 2025   INR Goal: 2.0-3.0  INR monitoring is per [facility] protocol.  Anticoagulation Medication: warfarin  Indication: Atrial Fibrillation/Atrial Flutter    Subjective   Bleeding signs/symptoms: No    Bruising: No   Major bleeding event: No  Thrombosis signs/symptoms: No  Thromboembolic event: No  Missed doses: No  Extra doses: No  Medication changes: No  Dietary changes: No  Change in health: No  Change in activity: No  Alcohol: No  Other concerns: No    Upcoming Procedures:  Does the Patient Have any upcoming procedures that require interruption in anticoagulation therapy? no  Does the patient require bridging? no      Anticoagulation Summary  As of 2025      INR goal:  2.0-3.0   TTR:  79.7% (1.7 y)   INR used for dosin.00 (2025)   Weekly warfarin total:  37.5 mg               Assessment/Plan   Therapeutic     1. New dose: no change    2. Next INR: 1 month      Education provided to patient during the visit:  Patient instructed to call in interim with questions, concerns and changes.

## 2025-06-26 ENCOUNTER — ANTICOAGULATION - WARFARIN VISIT (OUTPATIENT)
Dept: CARDIOLOGY | Facility: CLINIC | Age: 80
End: 2025-06-26
Payer: MEDICARE

## 2025-06-26 DIAGNOSIS — I48.91 ATRIAL FIBRILLATION, UNSPECIFIED TYPE (MULTI): Primary | ICD-10-CM

## 2025-06-26 LAB
POC INR: 2.7 (ref 0.9–1.1)
POC PROTHROMBIN TIME: ABNORMAL (ref 9.3–12.5)

## 2025-06-26 PROCEDURE — 85610 PROTHROMBIN TIME: CPT | Mod: QW

## 2025-06-26 NOTE — PROGRESS NOTES
Patient identification verified with 2 identifiers.    Location: [facility] - [address] [place] [address] [phone]     Referring Physician: Jillian Vizcarra DO   Enrollment/ Re-enrollment date: 2025   INR Goal: 2.0-3.0  INR monitoring is per [facility] protocol.  Anticoagulation Medication: warfarin  Indication: Atrial Fibrillation/Atrial Flutter    Subjective   Bleeding signs/symptoms: No    Bruising: No   Major bleeding event: No  Thrombosis signs/symptoms: No  Thromboembolic event: No  Missed doses: No  Extra doses: No  Medication changes: No  Dietary changes: No  Change in health: No  Change in activity: No  Alcohol: No  Other concerns: No    Upcoming Procedures:  Does the Patient Have any upcoming procedures that require interruption in anticoagulation therapy? no  Does the patient require bridging? no      Anticoagulation Summary  As of 2025      INR goal:  2.0-3.0   TTR:  80.6% (1.8 y)   INR used for dosin.70 (2025)   Weekly warfarin total:  37.5 mg               Assessment/Plan   Therapeutic     1. New dose: Will maintain dose and patient will return in 4 weeks.    2. Next INR: 1 month      Education provided to patient during the visit:  Patient instructed to call in interim with questions, concerns and changes.

## 2025-07-07 ENCOUNTER — HOSPITAL ENCOUNTER (OUTPATIENT)
Dept: RADIOLOGY | Facility: CLINIC | Age: 80
End: 2025-07-07
Payer: MEDICARE

## 2025-07-07 ENCOUNTER — HOSPITAL ENCOUNTER (OUTPATIENT)
Dept: RADIOLOGY | Facility: CLINIC | Age: 80
Discharge: HOME | End: 2025-07-07
Payer: MEDICARE

## 2025-07-07 DIAGNOSIS — I50.20 UNSPECIFIED SYSTOLIC (CONGESTIVE) HEART FAILURE: ICD-10-CM

## 2025-07-07 DIAGNOSIS — Z12.31 ENCOUNTER FOR SCREENING MAMMOGRAM FOR MALIGNANT NEOPLASM OF BREAST: ICD-10-CM

## 2025-07-07 DIAGNOSIS — Z00.00 ENCOUNTER FOR GENERAL ADULT MEDICAL EXAMINATION WITHOUT ABNORMAL FINDINGS: ICD-10-CM

## 2025-07-07 PROCEDURE — 71046 X-RAY EXAM CHEST 2 VIEWS: CPT | Performed by: RADIOLOGY

## 2025-07-07 PROCEDURE — 77067 SCR MAMMO BI INCL CAD: CPT | Performed by: RADIOLOGY

## 2025-07-07 PROCEDURE — 77067 SCR MAMMO BI INCL CAD: CPT

## 2025-07-07 PROCEDURE — 71046 X-RAY EXAM CHEST 2 VIEWS: CPT

## 2025-07-07 PROCEDURE — 77063 BREAST TOMOSYNTHESIS BI: CPT | Performed by: RADIOLOGY

## 2025-07-10 ENCOUNTER — HOSPITAL ENCOUNTER (OUTPATIENT)
Dept: RADIOLOGY | Facility: CLINIC | Age: 80
Discharge: HOME | End: 2025-07-10
Payer: MEDICARE

## 2025-07-10 ENCOUNTER — APPOINTMENT (OUTPATIENT)
Dept: RADIOLOGY | Facility: CLINIC | Age: 80
End: 2025-07-10
Payer: MEDICARE

## 2025-07-10 DIAGNOSIS — Z78.0 ASYMPTOMATIC MENOPAUSAL STATE: ICD-10-CM

## 2025-07-10 PROCEDURE — 77080 DXA BONE DENSITY AXIAL: CPT

## 2025-07-24 ENCOUNTER — ANTICOAGULATION - WARFARIN VISIT (OUTPATIENT)
Dept: CARDIOLOGY | Facility: CLINIC | Age: 80
End: 2025-07-24
Payer: MEDICARE

## 2025-07-24 DIAGNOSIS — I48.91 ATRIAL FIBRILLATION, UNSPECIFIED TYPE (MULTI): Primary | ICD-10-CM

## 2025-07-24 LAB
POC INR: 3.2 (ref 0.9–1.1)
POC PROTHROMBIN TIME: ABNORMAL (ref 9.3–12.5)

## 2025-07-24 PROCEDURE — 99211 OFF/OP EST MAY X REQ PHY/QHP: CPT

## 2025-07-24 PROCEDURE — 85610 PROTHROMBIN TIME: CPT | Mod: QW | Performed by: FAMILY MEDICINE

## 2025-07-24 NOTE — PROGRESS NOTES
Patient identification verified with 2 identifiers.    Location: [facility] - [address] [place] [address] [phone]     Referring Physician: Jillian Vizcarra DO   Enrollment/ Re-enrollment date: 12/16/2025   INR Goal: 2.0-3.0  INR monitoring is per [facility] protocol.  Anticoagulation Medication: warfarin  Indication: Atrial Fibrillation/Atrial Flutter    Subjective   Bleeding signs/symptoms: No    Bruising: No   Major bleeding event: No  Thrombosis signs/symptoms: No  Thromboembolic event: No  Missed doses: No  Extra doses: No  Medication changes: No  Dietary changes: No  Change in health: No  Change in activity: No  Alcohol: No  Other concerns: No    Upcoming Procedures:  Does the Patient Have any upcoming procedures that require interruption in anticoagulation therapy? no  Does the patient require bridging? no      Anticoagulation Summary  As of 7/24/2025      INR goal:  2.0-3.0   TTR:  79.7% (1.8 y)   INR used for dosing:  3.20 (7/24/2025)   Weekly warfarin total:  37.5 mg               Assessment/Plan   Supratherapeutic     1. New dose: Will maintain dose and patient will return in 1 week.  Patient is usually therapeutic on this dosing    2. Next INR: 1 week      Education provided to patient during the visit:  Patient instructed to call in interim with questions, concerns and changes.

## 2025-08-04 ENCOUNTER — ANTICOAGULATION - WARFARIN VISIT (OUTPATIENT)
Dept: CARDIOLOGY | Facility: CLINIC | Age: 80
End: 2025-08-04
Payer: MEDICARE

## 2025-08-04 DIAGNOSIS — I48.91 ATRIAL FIBRILLATION, UNSPECIFIED TYPE (MULTI): Primary | ICD-10-CM

## 2025-08-04 LAB
POC INR: 3.2 (ref 0.9–1.1)
POC PROTHROMBIN TIME: ABNORMAL (ref 9.3–12.5)

## 2025-08-04 PROCEDURE — 85610 PROTHROMBIN TIME: CPT | Mod: QW | Performed by: FAMILY MEDICINE

## 2025-08-04 PROCEDURE — 99211 OFF/OP EST MAY X REQ PHY/QHP: CPT

## 2025-08-04 NOTE — PROGRESS NOTES
Patient identification verified with 2 identifiers.    Location: [facility] - [address] [place] [address] [phone]     Referring Physician: Jillian Vizcarra DO   Enrollment/ Re-enrollment date: 12/16/2025   INR Goal: 2.0-3.0  INR monitoring is per [facility] protocol.  Anticoagulation Medication: warfarin  Indication: Atrial Fibrillation/Atrial Flutter    Subjective   Bleeding signs/symptoms: No    Bruising: No   Major bleeding event: No  Thrombosis signs/symptoms: No  Thromboembolic event: No  Missed doses: No  Extra doses: No  Medication changes: No  Dietary changes: No  Change in health: No  Change in activity: No  Alcohol: No  Other concerns: No    Upcoming Procedures:  Does the Patient Have any upcoming procedures that require interruption in anticoagulation therapy? no  Does the patient require bridging? no      Anticoagulation Summary  As of 8/4/2025      INR goal:  2.0-3.0   TTR:  78.5% (1.9 y)   INR used for dosing:  3.20 (8/4/2025)   Weekly warfarin total:  35 mg               Assessment/Plan   Therapeutic     1. New dose: Will decrease dose and patient will return in 2 weeks instead of 1 week due to patient being on vacation.    2. Next INR: 1 week      Education provided to patient during the visit:  Patient instructed to call in interim with questions, concerns and changes.

## 2025-08-21 ENCOUNTER — ANTICOAGULATION - WARFARIN VISIT (OUTPATIENT)
Dept: CARDIOLOGY | Facility: CLINIC | Age: 80
End: 2025-08-21
Payer: MEDICARE

## 2025-08-21 ENCOUNTER — APPOINTMENT (OUTPATIENT)
Dept: CARDIOLOGY | Facility: CLINIC | Age: 80
End: 2025-08-21
Payer: MEDICARE

## 2025-08-21 DIAGNOSIS — I48.91 ATRIAL FIBRILLATION, UNSPECIFIED TYPE (MULTI): Primary | ICD-10-CM

## 2025-08-22 ENCOUNTER — TELEPHONE (OUTPATIENT)
Dept: CARDIOLOGY | Facility: CLINIC | Age: 80
End: 2025-08-22
Payer: MEDICARE

## 2025-08-28 ENCOUNTER — APPOINTMENT (OUTPATIENT)
Dept: CARDIOLOGY | Facility: CLINIC | Age: 80
End: 2025-08-28
Payer: MEDICARE

## 2025-09-02 ENCOUNTER — ANTICOAGULATION - WARFARIN VISIT (OUTPATIENT)
Dept: CARDIOLOGY | Facility: CLINIC | Age: 80
End: 2025-09-02
Payer: MEDICARE

## 2025-09-02 DIAGNOSIS — I48.91 ATRIAL FIBRILLATION, UNSPECIFIED TYPE (MULTI): Primary | ICD-10-CM

## 2025-09-02 LAB
POC INR: 1.3 (ref 0.9–1.1)
POC PROTHROMBIN TIME: ABNORMAL (ref 9.3–12.5)

## 2025-09-02 PROCEDURE — 85610 PROTHROMBIN TIME: CPT | Mod: QW | Performed by: FAMILY MEDICINE

## 2025-09-02 PROCEDURE — 99211 OFF/OP EST MAY X REQ PHY/QHP: CPT

## 2025-09-04 ENCOUNTER — APPOINTMENT (OUTPATIENT)
Dept: CARDIOLOGY | Facility: CLINIC | Age: 80
End: 2025-09-04
Payer: MEDICARE

## 2025-09-04 ENCOUNTER — ANTICOAGULATION - WARFARIN VISIT (OUTPATIENT)
Dept: CARDIOLOGY | Facility: CLINIC | Age: 80
End: 2025-09-04
Payer: MEDICARE

## 2025-09-04 ENCOUNTER — APPOINTMENT (OUTPATIENT)
Dept: ORTHOPEDIC SURGERY | Facility: CLINIC | Age: 80
End: 2025-09-04
Payer: MEDICARE

## 2025-09-04 DIAGNOSIS — I48.91 ATRIAL FIBRILLATION, UNSPECIFIED TYPE (MULTI): Primary | ICD-10-CM

## 2025-09-04 LAB
POC INR: 1.2 (ref 0.9–1.1)
POC PROTHROMBIN TIME: ABNORMAL (ref 9.3–12.5)

## 2025-09-04 PROCEDURE — 85610 PROTHROMBIN TIME: CPT | Mod: QW | Performed by: FAMILY MEDICINE

## 2025-09-04 PROCEDURE — 99211 OFF/OP EST MAY X REQ PHY/QHP: CPT

## 2025-09-08 ENCOUNTER — APPOINTMENT (OUTPATIENT)
Dept: CARDIOLOGY | Facility: CLINIC | Age: 80
End: 2025-09-08
Payer: MEDICARE

## 2025-12-04 ENCOUNTER — APPOINTMENT (OUTPATIENT)
Dept: ORTHOPEDIC SURGERY | Facility: CLINIC | Age: 80
End: 2025-12-04
Payer: MEDICARE